# Patient Record
Sex: MALE | Race: WHITE | NOT HISPANIC OR LATINO | Employment: FULL TIME | URBAN - METROPOLITAN AREA
[De-identification: names, ages, dates, MRNs, and addresses within clinical notes are randomized per-mention and may not be internally consistent; named-entity substitution may affect disease eponyms.]

---

## 2023-03-03 PROBLEM — R63.5 ABNORMAL WEIGHT GAIN: Status: ACTIVE | Noted: 2023-03-03

## 2023-03-03 PROBLEM — D64.9 ANEMIA: Status: ACTIVE | Noted: 2023-03-03

## 2023-03-03 PROBLEM — E78.5 HYPERLIPIDEMIA: Status: ACTIVE | Noted: 2023-03-03

## 2023-03-03 PROBLEM — M25.562 ARTHRALGIA OF BOTH KNEES: Status: ACTIVE | Noted: 2023-03-03

## 2023-03-03 PROBLEM — M21.41 FLAT FEET: Status: ACTIVE | Noted: 2023-03-03

## 2023-03-03 PROBLEM — I10 HTN (HYPERTENSION), BENIGN: Status: ACTIVE | Noted: 2023-03-03

## 2023-03-03 PROBLEM — M54.50 LOW BACK PAIN: Status: ACTIVE | Noted: 2023-03-03

## 2023-03-03 PROBLEM — S46.211A TEAR OF RIGHT BICEPS MUSCLE: Status: ACTIVE | Noted: 2023-03-03

## 2023-03-03 PROBLEM — M21.169 ACQUIRED VARUS DEFORMITY KNEE: Status: ACTIVE | Noted: 2023-03-03

## 2023-03-03 PROBLEM — J01.81 OTHER ACUTE RECURRENT SINUSITIS: Status: ACTIVE | Noted: 2023-03-03

## 2023-03-03 PROBLEM — M17.0 ARTHRITIS OF BOTH KNEES: Status: ACTIVE | Noted: 2023-03-03

## 2023-03-03 PROBLEM — M77.42 METATARSALGIA OF LEFT FOOT: Status: ACTIVE | Noted: 2023-03-03

## 2023-03-03 PROBLEM — K21.9 ESOPHAGEAL REFLUX: Status: ACTIVE | Noted: 2023-03-03

## 2023-03-03 PROBLEM — M53.3 PAIN OF RIGHT SACROILIAC JOINT: Status: ACTIVE | Noted: 2023-03-03

## 2023-03-03 PROBLEM — M60.9: Status: ACTIVE | Noted: 2023-03-03

## 2023-03-03 PROBLEM — S46.219A BICEPS TENDON RUPTURE: Status: ACTIVE | Noted: 2023-03-03

## 2023-03-03 PROBLEM — E66.01 CLASS 2 SEVERE OBESITY WITH SERIOUS COMORBIDITY AND BODY MASS INDEX (BMI) OF 37.0 TO 37.9 IN ADULT (MULTI): Status: ACTIVE | Noted: 2023-03-03

## 2023-03-03 PROBLEM — E87.1 HYPONATREMIA: Status: ACTIVE | Noted: 2023-03-03

## 2023-03-03 PROBLEM — E66.01 MORBID OBESITY (MULTI): Status: ACTIVE | Noted: 2023-03-03

## 2023-03-03 PROBLEM — R03.0 ELEVATED BLOOD PRESSURE READING: Status: ACTIVE | Noted: 2023-03-03

## 2023-03-03 PROBLEM — R35.1 NOCTURIA: Status: ACTIVE | Noted: 2023-03-03

## 2023-03-03 PROBLEM — H65.01 RIGHT ACUTE SEROUS OTITIS MEDIA: Status: ACTIVE | Noted: 2023-03-03

## 2023-03-03 PROBLEM — K21.9 GERD (GASTROESOPHAGEAL REFLUX DISEASE): Status: ACTIVE | Noted: 2023-03-03

## 2023-03-03 PROBLEM — M54.16 LUMBAR RADICULOPATHY: Status: ACTIVE | Noted: 2023-03-03

## 2023-03-03 PROBLEM — L25.8 UNSPECIFIED CONTACT DERMATITIS DUE TO OTHER AGENTS: Status: ACTIVE | Noted: 2023-03-03

## 2023-03-03 PROBLEM — M25.511 RIGHT SHOULDER PAIN: Status: ACTIVE | Noted: 2023-03-03

## 2023-03-03 PROBLEM — M25.561 ARTHRALGIA OF BOTH KNEES: Status: ACTIVE | Noted: 2023-03-03

## 2023-03-03 PROBLEM — B07.8 PALMAR WART: Status: ACTIVE | Noted: 2023-03-03

## 2023-03-03 PROBLEM — J01.81 OTHER ACUTE RECURRENT SINUSITIS: Status: RESOLVED | Noted: 2023-03-03 | Resolved: 2023-03-03

## 2023-03-03 PROBLEM — M21.42 FLAT FEET: Status: ACTIVE | Noted: 2023-03-03

## 2023-03-03 PROBLEM — I51.9 HEART DISEASE: Status: ACTIVE | Noted: 2023-03-03

## 2023-03-03 PROBLEM — I25.10 CORONARY ARTERY DISEASE: Status: ACTIVE | Noted: 2023-03-03

## 2023-03-03 PROBLEM — E66.812 CLASS 2 SEVERE OBESITY WITH SERIOUS COMORBIDITY AND BODY MASS INDEX (BMI) OF 37.0 TO 37.9 IN ADULT: Status: ACTIVE | Noted: 2023-03-03

## 2023-03-03 PROBLEM — L23.89 ALLERGIC CONTACT DERMATITIS DUE TO OTHER AGENTS: Status: ACTIVE | Noted: 2023-03-03

## 2023-03-03 PROBLEM — R52 PAIN: Status: ACTIVE | Noted: 2023-03-03

## 2023-03-03 RX ORDER — FLUTICASONE PROPIONATE 50 MCG
1 SPRAY, SUSPENSION (ML) NASAL 2 TIMES DAILY
COMMUNITY
End: 2023-07-13 | Stop reason: WASHOUT

## 2023-03-03 RX ORDER — GLUCOSAMINE/MSM/CHONDROIT SULF 500-166.6
1 TABLET ORAL DAILY
COMMUNITY
Start: 2021-06-23 | End: 2023-07-13 | Stop reason: WASHOUT

## 2023-03-03 RX ORDER — TAMSULOSIN HYDROCHLORIDE 0.4 MG/1
0.8 CAPSULE ORAL NIGHTLY
COMMUNITY
End: 2023-10-12 | Stop reason: SDUPTHER

## 2023-03-03 RX ORDER — DOXAZOSIN 2 MG/1
2 TABLET ORAL NIGHTLY
COMMUNITY
Start: 2021-11-01

## 2023-03-03 RX ORDER — LOSARTAN POTASSIUM AND HYDROCHLOROTHIAZIDE 25; 100 MG/1; MG/1
1 TABLET ORAL DAILY
COMMUNITY
Start: 2021-11-01 | End: 2023-09-11 | Stop reason: ALTCHOICE

## 2023-03-03 RX ORDER — PANTOPRAZOLE SODIUM 40 MG/1
40 TABLET, DELAYED RELEASE ORAL
COMMUNITY
Start: 2017-09-05 | End: 2023-03-07 | Stop reason: SDUPTHER

## 2023-03-03 RX ORDER — METOPROLOL TARTRATE 25 MG/1
0.5 TABLET, FILM COATED ORAL NIGHTLY
COMMUNITY
Start: 2020-08-13 | End: 2024-01-24 | Stop reason: SDUPTHER

## 2023-03-03 RX ORDER — LIDOCAINE 50 MG/G
PATCH TOPICAL
COMMUNITY
Start: 2020-08-07 | End: 2023-07-13 | Stop reason: WASHOUT

## 2023-03-03 RX ORDER — GABAPENTIN 100 MG/1
2 CAPSULE ORAL NIGHTLY
COMMUNITY
Start: 2021-11-01 | End: 2023-07-13 | Stop reason: WASHOUT

## 2023-03-03 RX ORDER — CLOPIDOGREL BISULFATE 75 MG/1
1 TABLET ORAL DAILY
COMMUNITY
Start: 2020-07-28 | End: 2024-02-22 | Stop reason: SDUPTHER

## 2023-03-03 RX ORDER — ROSUVASTATIN CALCIUM 40 MG/1
1 TABLET, COATED ORAL NIGHTLY
COMMUNITY
Start: 2021-09-21

## 2023-03-03 RX ORDER — OXAPROZIN 600 MG/1
1 TABLET, FILM COATED ORAL 2 TIMES DAILY
COMMUNITY
Start: 2017-09-05 | End: 2023-07-13 | Stop reason: WASHOUT

## 2023-03-07 ENCOUNTER — OFFICE VISIT (OUTPATIENT)
Dept: PRIMARY CARE | Facility: CLINIC | Age: 63
End: 2023-03-07
Payer: COMMERCIAL

## 2023-03-07 VITALS
HEIGHT: 66 IN | SYSTOLIC BLOOD PRESSURE: 122 MMHG | BODY MASS INDEX: 36.16 KG/M2 | WEIGHT: 225 LBS | RESPIRATION RATE: 16 BRPM | HEART RATE: 62 BPM | TEMPERATURE: 97.5 F | DIASTOLIC BLOOD PRESSURE: 74 MMHG

## 2023-03-07 DIAGNOSIS — M25.562 LEFT KNEE PAIN, UNSPECIFIED CHRONICITY: ICD-10-CM

## 2023-03-07 DIAGNOSIS — M25.562 ARTHRALGIA OF BOTH KNEES: ICD-10-CM

## 2023-03-07 DIAGNOSIS — K21.9 GASTROESOPHAGEAL REFLUX DISEASE, UNSPECIFIED WHETHER ESOPHAGITIS PRESENT: Primary | ICD-10-CM

## 2023-03-07 DIAGNOSIS — M25.561 ARTHRALGIA OF BOTH KNEES: ICD-10-CM

## 2023-03-07 DIAGNOSIS — I51.9 HEART DISEASE: ICD-10-CM

## 2023-03-07 DIAGNOSIS — M25.562 PAIN AND SWELLING OF KNEE, LEFT: ICD-10-CM

## 2023-03-07 DIAGNOSIS — M25.462 PAIN AND SWELLING OF KNEE, LEFT: ICD-10-CM

## 2023-03-07 PROCEDURE — 3074F SYST BP LT 130 MM HG: CPT | Performed by: INTERNAL MEDICINE

## 2023-03-07 PROCEDURE — 3078F DIAST BP <80 MM HG: CPT | Performed by: INTERNAL MEDICINE

## 2023-03-07 PROCEDURE — 99213 OFFICE O/P EST LOW 20 MIN: CPT | Performed by: INTERNAL MEDICINE

## 2023-03-07 RX ORDER — PREDNISONE 20 MG/1
TABLET ORAL
Qty: 20 TABLET | Refills: 0 | Status: SHIPPED | OUTPATIENT
Start: 2023-03-07 | End: 2023-05-09 | Stop reason: ALTCHOICE

## 2023-03-07 RX ORDER — PANTOPRAZOLE SODIUM 40 MG/1
40 TABLET, DELAYED RELEASE ORAL
Qty: 30 TABLET | Refills: 11 | Status: SHIPPED | OUTPATIENT
Start: 2023-03-07 | End: 2023-09-05 | Stop reason: SDUPTHER

## 2023-03-07 ASSESSMENT — PAIN SCALES - GENERAL: PAINLEVEL: 8

## 2023-03-28 ENCOUNTER — APPOINTMENT (OUTPATIENT)
Dept: PRIMARY CARE | Facility: CLINIC | Age: 63
End: 2023-03-28
Payer: COMMERCIAL

## 2023-04-11 ENCOUNTER — TELEPHONE (OUTPATIENT)
Dept: PRIMARY CARE | Facility: CLINIC | Age: 63
End: 2023-04-11
Payer: COMMERCIAL

## 2023-04-11 NOTE — LETTER
April 11, 2023       No Recipients    Patient: Becca Horvath   YOB: 1960   Date of Visit: 4/11/2023       To whom it may concern,    Re: Mr Becca Horvath.     This is to verify, that Mr Becca Horvath can return to work 4/17/2023 with           Sincerely,     Suri Negro MD

## 2023-05-09 ENCOUNTER — OFFICE VISIT (OUTPATIENT)
Dept: PRIMARY CARE | Facility: CLINIC | Age: 63
End: 2023-05-09
Payer: COMMERCIAL

## 2023-05-09 VITALS
TEMPERATURE: 97.3 F | HEIGHT: 66 IN | SYSTOLIC BLOOD PRESSURE: 122 MMHG | DIASTOLIC BLOOD PRESSURE: 76 MMHG | HEART RATE: 64 BPM | RESPIRATION RATE: 16 BRPM | BODY MASS INDEX: 34.87 KG/M2 | WEIGHT: 217 LBS

## 2023-05-09 DIAGNOSIS — R10.9 ABDOMINAL PAIN, UNSPECIFIED ABDOMINAL LOCATION: Primary | ICD-10-CM

## 2023-05-09 PROCEDURE — 99213 OFFICE O/P EST LOW 20 MIN: CPT | Performed by: INTERNAL MEDICINE

## 2023-05-09 PROCEDURE — 1036F TOBACCO NON-USER: CPT | Performed by: INTERNAL MEDICINE

## 2023-05-09 PROCEDURE — 3074F SYST BP LT 130 MM HG: CPT | Performed by: INTERNAL MEDICINE

## 2023-05-09 PROCEDURE — 3078F DIAST BP <80 MM HG: CPT | Performed by: INTERNAL MEDICINE

## 2023-05-09 ASSESSMENT — ENCOUNTER SYMPTOMS
BACK PAIN: 1
ABDOMINAL PAIN: 1

## 2023-05-09 ASSESSMENT — PAIN SCALES - GENERAL: PAINLEVEL: 6

## 2023-05-09 NOTE — PROGRESS NOTES
Subjective    Becca Horvath is a 62 y.o. male who presents for  follow up.  Has RLQ abdominal pain episodes.  Complaining of knee pain, difficulty to ambulate.  Scheduled his surgery 6/7/2023.       Abdominal Pain  This is a recurrent problem. The current episode started 1 to 4 weeks ago. The onset quality is sudden. The problem occurs intermittently. Duration: from 5 min  to 15 min. The pain is located in the RLQ. The pain is at a severity of 10/10. The pain is severe. The quality of the pain is tearing. The abdominal pain does not radiate. Nothing aggravates the pain. The pain is relieved by Recumbency. He has tried nothing for the symptoms.     This is a 62 years Old man with medical History of HTN, Hyperlipidemia, GERD, CAD, s/p PCI placement with 4 stents placement, anemia, bilateral knee osteoarthritis, BPH.  Patient is presented for evaluation and treatment of the above complaints.  Has abdominal pain discomfort.    Patient is  seen  by Dr Nessa Mcgill, has had MRI done 2/13/2023.  MRI showed advanced Tricompartmental osteoarthrosis, most pronounced over the medial femorotibial compartment.  Moderate volume effusion with synovitis.  High-grade sprain of the medial collateral ligament component of the meniscal femoral ligament.  Suspect subacute to remote low to intermediate grade sprain or superficial component of medial collateral ligament.  Complex medial and lateral meniscal tears as above.   No improvement from any therapy.  Scheduled to have surgery 6/7/2023.  Has difficulty to ambulate, difficulty to change his position, using crutches.      Followed by cardiology.        Review of Systems   Cardiovascular:  Negative for chest pain and leg swelling.   Gastrointestinal:  Positive for abdominal pain.   Musculoskeletal:  Positive for back pain.       Objective        Vitals:    05/09/23 1540   BP: 122/76   Pulse: 64   Resp: 16   Temp: 36.3 °C (97.3 °F)        Physical Exam  Constitutional:       Appearance:  Normal appearance.   HENT:      Head: Normocephalic.      Nose: Nose normal.      Mouth/Throat:      Mouth: Mucous membranes are dry.   Eyes:      Extraocular Movements: Extraocular movements intact.   Cardiovascular:      Rate and Rhythm: Normal rate and regular rhythm.   Abdominal:      Palpations: Abdomen is soft.   Musculoskeletal:      Cervical back: Neck supple.   Skin:     General: Skin is warm.   Neurological:      General: No focal deficit present.      Mental Status: He is alert.   Psychiatric:         Mood and Affect: Mood normal.       Diagnoses and all orders for this visit:  Abdominal pain, unspecified abdominal location (Primary)  -     CT abdomen pelvis wo IV contrast; Future       -Left knee pain.  Due to L Knee replacement 6/7/2023.  [facility].  Had MRI  done, showed advanced Tricompartmental osteoarthrosis, most pronounced over the medial femorotibial compartment.  Moderate volume effusion with synovitis.  High-grade sprain of the medial collateral ligament component of the meniscal femoral ligament.  Suspect subacute to remote low to intermediate grade sprain or superficial component of medial collateral ligament.  Complex medial and lateral meniscal tears as above.  Take steroids as directed.  Taper down every 3 days.     -Coronary artery disease.  History of stent placements 6 years ago.  Will obtain records.  Control risk factors.  Take medications as directed.     -Hypertension.  Well-controlled.  Avoid salt, exercise.     -Hyperlipidemia.  Taking rosuvastatin 40 mg at nighttime.  Keep Mediterranean diet, exercise.     -BPH.  Taking doxazosin.  PSA.     -Gastroesophageal reflux disease.  Eat small portions.  Avoid Caffeine, spicy foods, chocolate, alcohol.  Do not wear tight clothes.  Keep last meal before bed time > 3 hours.  Keep head side of the bed elevated at all time.     -Health maintenance.  Return for complete physical exam.  Colonoscopy.  Ophthalmology.  Influenza  vaccination today.     - Class 2 Obesity with BMI 35.02.  Diet, exercise.  Keep ideal BMI less than 25.     Suri Negro MD

## 2023-05-11 DIAGNOSIS — R91.1 PULMONARY NODULE: Primary | ICD-10-CM

## 2023-05-11 NOTE — PROGRESS NOTES
Left message about patient's CT scan of the abdomen pelvis results.  Patient is in need for CT scan of chest for follow-up of pulmonary nodules.

## 2023-05-12 ENCOUNTER — TELEPHONE (OUTPATIENT)
Dept: PRIMARY CARE | Facility: CLINIC | Age: 63
End: 2023-05-12
Payer: COMMERCIAL

## 2023-06-05 ENCOUNTER — TELEPHONE (OUTPATIENT)
Dept: PRIMARY CARE | Facility: CLINIC | Age: 63
End: 2023-06-05
Payer: COMMERCIAL

## 2023-06-05 NOTE — TELEPHONE ENCOUNTER
Cussed with patient about CT scan results.  He is scheduled to have nonemergent CT scan of the chest done for small pulmonary nodules.  Patient is aware.  Scheduled to have orthopedic surgery 6/7/2023.

## 2023-07-13 ENCOUNTER — OFFICE VISIT (OUTPATIENT)
Dept: PRIMARY CARE | Facility: CLINIC | Age: 63
End: 2023-07-13
Payer: COMMERCIAL

## 2023-07-13 DIAGNOSIS — R53.81 MALAISE AND FATIGUE: ICD-10-CM

## 2023-07-13 DIAGNOSIS — I10 HTN (HYPERTENSION), BENIGN: ICD-10-CM

## 2023-07-13 DIAGNOSIS — R73.9 HYPERGLYCEMIA: ICD-10-CM

## 2023-07-13 DIAGNOSIS — R53.83 MALAISE AND FATIGUE: ICD-10-CM

## 2023-07-13 DIAGNOSIS — E87.1 HYPONATREMIA: Primary | ICD-10-CM

## 2023-07-13 PROCEDURE — 85025 COMPLETE CBC W/AUTO DIFF WBC: CPT | Performed by: INTERNAL MEDICINE

## 2023-07-13 PROCEDURE — 3074F SYST BP LT 130 MM HG: CPT | Performed by: INTERNAL MEDICINE

## 2023-07-13 PROCEDURE — 80048 BASIC METABOLIC PNL TOTAL CA: CPT | Performed by: INTERNAL MEDICINE

## 2023-07-13 PROCEDURE — 83036 HEMOGLOBIN GLYCOSYLATED A1C: CPT | Performed by: INTERNAL MEDICINE

## 2023-07-13 PROCEDURE — 99213 OFFICE O/P EST LOW 20 MIN: CPT | Performed by: INTERNAL MEDICINE

## 2023-07-13 PROCEDURE — 1036F TOBACCO NON-USER: CPT | Performed by: INTERNAL MEDICINE

## 2023-07-13 PROCEDURE — 3078F DIAST BP <80 MM HG: CPT | Performed by: INTERNAL MEDICINE

## 2023-07-13 NOTE — PROGRESS NOTES
Subjective    Becca Horvath is a 62 y.o. male who presents for  follow up.  Concerned about low blood pressure episodes.  Concerned about Hyponatremia.  Has L knee pain.      HPI    This is a 62 years Old man with medical History of HTN, Hyperlipidemia, GERD, CAD, s/p PCI placement with 4 stents placement, anemia, bilateral knee osteoarthritis, BPH, s/p  L TKR 6/7/2023.  Patient is presented for evaluation and treatment of the above complaints.   Still has L knee pain, followed by orthopedic surgery.  Evaluated by Dr Morin, for low blood pressure episodes.  Has BW done, has Hyponatremia.  Advised to stop taking hydrochlorothiazide.      Recovering from L knee replacement surgery 6/7/2023.    Still has pain.     Review of Systems   Constitutional:  Positive for activity change and fatigue. Negative for appetite change and unexpected weight change.   Musculoskeletal:  Positive for arthralgias and back pain.   Psychiatric/Behavioral:  Positive for sleep disturbance. The patient is nervous/anxious.        Objective        Vitals:    07/13/23 1105   BP: 118/62   Pulse: 62   Resp: 16   Temp: 36.1 °C (97 °F)        Physical Exam  Constitutional:       Appearance: Normal appearance.   HENT:      Head: Normocephalic.      Nose: Nose normal.   Cardiovascular:      Pulses: Normal pulses.   Pulmonary:      Effort: Pulmonary effort is normal.   Abdominal:      Palpations: Abdomen is soft.   Musculoskeletal:         General: Normal range of motion.      Cervical back: Normal range of motion.   Skin:     General: Skin is warm.   Neurological:      General: No focal deficit present.      Mental Status: He is alert.       Diagnoses and all orders for this visit:  Hyponatremia (Primary)  -     Basic Metabolic Panel  Malaise and fatigue  -     CBC  Hyperglycemia  -     Hemoglobin A1C  HTN (hypertension), benign  -     losartan (Cozaar) 100 mg tablet; Take 1 tablet (100 mg) by mouth once daily. Stop taking Hyzaar        Hypotension.  Stopped fish oil, other supplements.  Advised to stop taking Hydrochlorothiazide.  Patient is still taking.  Repeat BMP today.     -Hyponatremia.  Repeated blood work 7/14/2023 showed sodium 138, chloride 94.  Stop taking HCTZ.  For your blood pressure you will be taking losartan only.    -Left knee pain.  S/p  L Knee replacement 6/7/2023.   Going through PT.  Not returned to work yet.  Due to return to work 8/2023.  Hyponatremia.  Repeat bmp.     -Coronary artery disease.  History of stent placements 6 years ago.  Will obtain records.  Control risk factors.  Take medications as directed.     -Hypertension.  Well-controlled.  Avoid salt, exercise.     -Hyperlipidemia.  Taking rosuvastatin 40 mg at nighttime.  Keep Mediterranean diet, exercise.     -BPH.  Taking doxazosin.  PSA.     -Gastroesophageal reflux disease.  Eat small portions.  Avoid Caffeine, spicy foods, chocolate, alcohol.  Do not wear tight clothes.  Keep last meal before bed time > 3 hours.  Keep head side of the bed elevated at all time.     -Health maintenance.  Return for complete physical exam.  Colonoscopy.  Ophthalmology.  Influenza vaccination today.     - Class 2 Obesity with BMI 34.14  Diet, exercise.  Keep ideal BMI less than 25.   Suri Negro MD

## 2023-07-14 RX ORDER — LOSARTAN POTASSIUM 100 MG/1
100 TABLET ORAL DAILY
Qty: 90 TABLET | Refills: 2 | Status: SHIPPED | OUTPATIENT
Start: 2023-07-14 | End: 2024-04-08 | Stop reason: SDUPTHER

## 2023-07-16 VITALS
DIASTOLIC BLOOD PRESSURE: 62 MMHG | SYSTOLIC BLOOD PRESSURE: 118 MMHG | TEMPERATURE: 97 F | RESPIRATION RATE: 16 BRPM | HEIGHT: 65 IN | HEART RATE: 62 BPM | WEIGHT: 202 LBS | BODY MASS INDEX: 33.66 KG/M2

## 2023-07-16 ASSESSMENT — ENCOUNTER SYMPTOMS
ARTHRALGIAS: 1
BACK PAIN: 1
SLEEP DISTURBANCE: 1
APPETITE CHANGE: 0
ACTIVITY CHANGE: 1
FATIGUE: 1
NERVOUS/ANXIOUS: 1
UNEXPECTED WEIGHT CHANGE: 0

## 2023-09-05 DIAGNOSIS — K21.9 GASTROESOPHAGEAL REFLUX DISEASE, UNSPECIFIED WHETHER ESOPHAGITIS PRESENT: ICD-10-CM

## 2023-09-05 RX ORDER — PANTOPRAZOLE SODIUM 40 MG/1
40 TABLET, DELAYED RELEASE ORAL
Qty: 90 TABLET | Refills: 0 | Status: SHIPPED | OUTPATIENT
Start: 2023-09-05 | End: 2023-12-18 | Stop reason: SDUPTHER

## 2023-09-09 NOTE — PROGRESS NOTES
Subjective    Becca Horvath is a 63 y.o. male who presents for  follow up.  Has R knee pain.  Has been having R sided low abdominal quadrant pain.  Weak.  Patient is here for Pulmonary nodules follow up.    HPI    This is a 62 years Old man with medical History of HTN, Hyperlipidemia, GERD, CAD, s/p PCI placement with 4 stents placement, anemia, bilateral knee osteoarthritis, BPH, s/p  L TKR 6/7/2023.  Patient is presented for evaluation and treatment of the above complaints.   Still has R  knee pain, followed by orthopedic surgery.  Scheduled to have surgery 12/2023.    Off  hydrochlorothiazide.       Review of Systems   Constitutional:  Negative for activity change and appetite change.   HENT:  Negative for congestion.    Respiratory:  Negative for apnea.    Gastrointestinal:  Positive for abdominal pain.   Musculoskeletal:  Positive for arthralgias.       Objective        Vitals:    09/11/23 1526   BP: 136/78   Pulse: 62   Resp: 16   Temp: 36.4 °C (97.5 °F)        Physical Exam  Constitutional:       Appearance: Normal appearance.   HENT:      Nose: Nose normal.   Eyes:      Pupils: Pupils are equal, round, and reactive to light.   Cardiovascular:      Rate and Rhythm: Normal rate and regular rhythm.   Pulmonary:      Effort: Pulmonary effort is normal.   Musculoskeletal:         General: Tenderness present. Normal range of motion.      Cervical back: Normal range of motion.   Skin:     General: Skin is warm.   Neurological:      General: No focal deficit present.      Mental Status: He is alert.   Psychiatric:         Mood and Affect: Mood normal.       Diagnoses and all orders for this visit:  Unilateral inguinal hernia without obstruction or gangrene, recurrence not specified (Primary)  -     Referral to General Surgery; Future  Iron deficiency anemia, unspecified iron deficiency anemia type  -     Ferritin  -     Iron and TIBC  -     CBC  -     ferrous sulfate 325 (65 Fe) MG EC tablet; Take 1 tablet (325 mg) by  mouth once daily with a meal. Do not crush, chew, or split.  Hyponatremia  -     Basic Metabolic Panel  - Umbilical hernia.  See surgery.    -Pulmonary nodules.  CT scan is scheduled for 9/2023.    HTN (hypertension), benign  -     losartan (Cozaar) 100 mg tablet; Take 1 tablet (100 mg) by mouth once daily.   Off Hyzaar.     -Left knee pain.  S/p  L Knee replacement 6/7/2023.  Had completed  PT.     R knee pain.  Scheduled for replacement 12/2023.    -Coronary artery disease.  History of stent placements 6 years ago.  Will obtain records.  Control risk factors.  Take medications as directed.         -Hyperlipidemia.  Taking rosuvastatin 40 mg at nighttime.  Keep Mediterranean diet, exercise.     -BPH.  Taking doxazosin.  PSA.     -Gastroesophageal reflux disease.  Eat small portions.  Avoid Caffeine, spicy foods, chocolate, alcohol.  Do not wear tight clothes.  Keep last meal before bed time > 3 hours.  Keep head side of the bed elevated at all time.     -Health maintenance.  Return for complete physical exam.  Colonoscopy.  Ophthalmology.  Influenza vaccination t.     - Class 2 Obesity with BMI 33.64.  Diet, exercise.  Keep ideal BMI less than 25.     Suri Negro MD

## 2023-09-11 ENCOUNTER — OFFICE VISIT (OUTPATIENT)
Dept: PRIMARY CARE | Facility: CLINIC | Age: 63
End: 2023-09-11
Payer: COMMERCIAL

## 2023-09-11 VITALS
TEMPERATURE: 97.5 F | RESPIRATION RATE: 16 BRPM | HEIGHT: 64 IN | WEIGHT: 196 LBS | BODY MASS INDEX: 33.46 KG/M2 | DIASTOLIC BLOOD PRESSURE: 78 MMHG | SYSTOLIC BLOOD PRESSURE: 136 MMHG | HEART RATE: 62 BPM

## 2023-09-11 DIAGNOSIS — K40.90 UNILATERAL INGUINAL HERNIA WITHOUT OBSTRUCTION OR GANGRENE, RECURRENCE NOT SPECIFIED: Primary | ICD-10-CM

## 2023-09-11 DIAGNOSIS — D50.9 IRON DEFICIENCY ANEMIA, UNSPECIFIED IRON DEFICIENCY ANEMIA TYPE: ICD-10-CM

## 2023-09-11 DIAGNOSIS — E87.1 HYPONATREMIA: ICD-10-CM

## 2023-09-11 LAB
FERRITIN (UG/LL) IN SER/PLAS: 167 UG/L (ref 20–300)
IRON (UG/DL) IN SER/PLAS: 69 UG/DL (ref 35–150)
IRON BINDING CAPACITY (UG/DL) IN SER/PLAS: 325 UG/DL (ref 240–445)
IRON SATURATION (%) IN SER/PLAS: 21 % (ref 25–45)

## 2023-09-11 PROCEDURE — 99213 OFFICE O/P EST LOW 20 MIN: CPT | Performed by: INTERNAL MEDICINE

## 2023-09-11 PROCEDURE — 82728 ASSAY OF FERRITIN: CPT

## 2023-09-11 PROCEDURE — 83540 ASSAY OF IRON: CPT

## 2023-09-11 PROCEDURE — 3075F SYST BP GE 130 - 139MM HG: CPT | Performed by: INTERNAL MEDICINE

## 2023-09-11 PROCEDURE — 83550 IRON BINDING TEST: CPT

## 2023-09-11 PROCEDURE — 1036F TOBACCO NON-USER: CPT | Performed by: INTERNAL MEDICINE

## 2023-09-11 PROCEDURE — 85025 COMPLETE CBC W/AUTO DIFF WBC: CPT | Performed by: INTERNAL MEDICINE

## 2023-09-11 PROCEDURE — 3078F DIAST BP <80 MM HG: CPT | Performed by: INTERNAL MEDICINE

## 2023-09-11 PROCEDURE — 80048 BASIC METABOLIC PNL TOTAL CA: CPT | Performed by: INTERNAL MEDICINE

## 2023-09-11 RX ORDER — FERROUS SULFATE 325(65) MG
325 TABLET, DELAYED RELEASE (ENTERIC COATED) ORAL
Qty: 90 TABLET | Refills: 1 | Status: SHIPPED | OUTPATIENT
Start: 2023-09-11 | End: 2023-12-10

## 2023-09-11 ASSESSMENT — ENCOUNTER SYMPTOMS
APPETITE CHANGE: 0
APNEA: 0
ABDOMINAL PAIN: 1
ARTHRALGIAS: 1
ACTIVITY CHANGE: 0

## 2023-09-11 ASSESSMENT — PAIN SCALES - GENERAL: PAINLEVEL: 6

## 2023-10-02 ENCOUNTER — ANCILLARY PROCEDURE (OUTPATIENT)
Dept: RADIOLOGY | Facility: CLINIC | Age: 63
End: 2023-10-02
Payer: COMMERCIAL

## 2023-10-02 DIAGNOSIS — R91.1 PULMONARY NODULE: ICD-10-CM

## 2023-10-02 PROCEDURE — 71250 CT THORAX DX C-: CPT | Performed by: STUDENT IN AN ORGANIZED HEALTH CARE EDUCATION/TRAINING PROGRAM

## 2023-10-02 PROCEDURE — 71250 CT THORAX DX C-: CPT

## 2023-10-06 ENCOUNTER — TELEPHONE (OUTPATIENT)
Dept: PRIMARY CARE | Facility: CLINIC | Age: 63
End: 2023-10-06
Payer: COMMERCIAL

## 2023-10-06 DIAGNOSIS — R91.8 PULMONARY NODULES: Primary | ICD-10-CM

## 2023-10-07 NOTE — PROGRESS NOTES
Subjective    Becca Horvath is a 63 y.o. male who presents for No chief complaint on file..  HPI    Review of Systems    Objective      There were no vitals filed for this visit.     Physical Exam  Diagnoses and all orders for this visit:  Pulmonary nodules (Primary)  -     CT chest wo IV contrast; Future      Suri Negro MD

## 2023-10-12 ENCOUNTER — OFFICE VISIT (OUTPATIENT)
Dept: PRIMARY CARE | Facility: CLINIC | Age: 63
End: 2023-10-12
Payer: COMMERCIAL

## 2023-10-12 VITALS
BODY MASS INDEX: 31.12 KG/M2 | DIASTOLIC BLOOD PRESSURE: 80 MMHG | SYSTOLIC BLOOD PRESSURE: 150 MMHG | TEMPERATURE: 96.9 F | WEIGHT: 193.6 LBS | HEIGHT: 66 IN | RESPIRATION RATE: 16 BRPM | HEART RATE: 60 BPM | OXYGEN SATURATION: 98 %

## 2023-10-12 DIAGNOSIS — R91.8 MULTIPLE LUNG NODULES ON CT: Primary | ICD-10-CM

## 2023-10-12 DIAGNOSIS — N40.0 BENIGN PROSTATIC HYPERPLASIA, UNSPECIFIED WHETHER LOWER URINARY TRACT SYMPTOMS PRESENT: Primary | ICD-10-CM

## 2023-10-12 PROBLEM — I63.9 CRYPTOGENIC STROKE (MULTI): Status: ACTIVE | Noted: 2020-07-28

## 2023-10-12 PROBLEM — M79.673 FOOT PAIN: Status: ACTIVE | Noted: 2017-12-10

## 2023-10-12 PROBLEM — M79.676 PAIN IN TOE: Status: ACTIVE | Noted: 2019-10-15

## 2023-10-12 PROBLEM — M79.675 PAIN IN TOES OF BOTH FEET: Status: ACTIVE | Noted: 2019-05-07

## 2023-10-12 PROBLEM — I25.10 ARTERIOSCLEROSIS OF CORONARY ARTERY: Status: ACTIVE | Noted: 2017-11-15

## 2023-10-12 PROBLEM — M79.674 PAIN IN TOES OF BOTH FEET: Status: ACTIVE | Noted: 2019-05-07

## 2023-10-12 PROBLEM — M21.6X9: Status: ACTIVE | Noted: 2020-12-07

## 2023-10-12 PROBLEM — M17.11 PRIMARY OSTEOARTHRITIS OF RIGHT KNEE: Status: ACTIVE | Noted: 2023-06-30

## 2023-10-12 PROBLEM — M79.605 PAIN IN BOTH LOWER EXTREMITIES: Status: ACTIVE | Noted: 2019-07-08

## 2023-10-12 PROBLEM — Q82.8: Status: ACTIVE | Noted: 2018-10-23

## 2023-10-12 PROBLEM — M21.969 DEFORMITY OF METATARSAL: Status: ACTIVE | Noted: 2019-10-15

## 2023-10-12 PROBLEM — R39.15 URINARY URGENCY: Status: ACTIVE | Noted: 2023-03-09

## 2023-10-12 PROBLEM — N40.1 BPH WITH OBSTRUCTION/LOWER URINARY TRACT SYMPTOMS: Status: ACTIVE | Noted: 2023-03-09

## 2023-10-12 PROBLEM — B35.3 TINEA PEDIS: Status: ACTIVE | Noted: 2020-02-18

## 2023-10-12 PROBLEM — N32.81 OAB (OVERACTIVE BLADDER): Status: ACTIVE | Noted: 2023-03-09

## 2023-10-12 PROBLEM — I87.8 VENOUS STASIS: Status: ACTIVE | Noted: 2023-10-12

## 2023-10-12 PROBLEM — Z95.5 STENTED CORONARY ARTERY: Status: ACTIVE | Noted: 2017-10-05

## 2023-10-12 PROBLEM — N52.03 COMBINED ARTERIAL INSUFFICIENCY AND CORPORO-VENOUS OCCLUSIVE ERECTILE DYSFUNCTION: Status: ACTIVE | Noted: 2023-03-09

## 2023-10-12 PROBLEM — M48.00 SPINAL STENOSIS: Status: ACTIVE | Noted: 2023-10-12

## 2023-10-12 PROBLEM — M79.604 PAIN IN BOTH LOWER EXTREMITIES: Status: ACTIVE | Noted: 2019-07-08

## 2023-10-12 PROBLEM — R29.90 STROKE-LIKE SYMPTOMS: Status: ACTIVE | Noted: 2020-06-05

## 2023-10-12 PROBLEM — N13.8 BPH WITH OBSTRUCTION/LOWER URINARY TRACT SYMPTOMS: Status: ACTIVE | Noted: 2023-03-09

## 2023-10-12 PROBLEM — M21.169 ACQUIRED GENU VARUM: Status: ACTIVE | Noted: 2021-10-14

## 2023-10-12 PROBLEM — B35.1 ONYCHOMYCOSIS OF TOENAIL: Status: ACTIVE | Noted: 2018-06-05

## 2023-10-12 PROBLEM — S90.122A CONTUSION OF TOE OF LEFT FOOT: Status: ACTIVE | Noted: 2022-05-24

## 2023-10-12 PROBLEM — Z96.652 STATUS POST TOTAL LEFT KNEE REPLACEMENT: Status: ACTIVE | Noted: 2023-06-07

## 2023-10-12 PROCEDURE — 1036F TOBACCO NON-USER: CPT | Performed by: NURSE PRACTITIONER

## 2023-10-12 PROCEDURE — 99212 OFFICE O/P EST SF 10 MIN: CPT | Mod: ZK | Performed by: NURSE PRACTITIONER

## 2023-10-12 PROCEDURE — 3077F SYST BP >= 140 MM HG: CPT | Performed by: NURSE PRACTITIONER

## 2023-10-12 PROCEDURE — 3079F DIAST BP 80-89 MM HG: CPT | Performed by: NURSE PRACTITIONER

## 2023-10-12 PROCEDURE — 99213 OFFICE O/P EST LOW 20 MIN: CPT | Performed by: NURSE PRACTITIONER

## 2023-10-12 RX ORDER — AMLODIPINE BESYLATE 5 MG/1
5 TABLET ORAL
COMMUNITY
Start: 2023-09-26

## 2023-10-12 RX ORDER — TAMSULOSIN HYDROCHLORIDE 0.4 MG/1
0.8 CAPSULE ORAL NIGHTLY
Qty: 60 CAPSULE | Refills: 0 | Status: SHIPPED | OUTPATIENT
Start: 2023-10-12 | End: 2023-11-06 | Stop reason: SDUPTHER

## 2023-10-12 SDOH — ECONOMIC STABILITY: FOOD INSECURITY: WITHIN THE PAST 12 MONTHS, THE FOOD YOU BOUGHT JUST DIDN'T LAST AND YOU DIDN'T HAVE MONEY TO GET MORE.: NEVER TRUE

## 2023-10-12 SDOH — ECONOMIC STABILITY: FOOD INSECURITY: WITHIN THE PAST 12 MONTHS, YOU WORRIED THAT YOUR FOOD WOULD RUN OUT BEFORE YOU GOT MONEY TO BUY MORE.: NEVER TRUE

## 2023-10-12 ASSESSMENT — COLUMBIA-SUICIDE SEVERITY RATING SCALE - C-SSRS
6. HAVE YOU EVER DONE ANYTHING, STARTED TO DO ANYTHING, OR PREPARED TO DO ANYTHING TO END YOUR LIFE?: NO
2. HAVE YOU ACTUALLY HAD ANY THOUGHTS OF KILLING YOURSELF?: NO
1. IN THE PAST MONTH, HAVE YOU WISHED YOU WERE DEAD OR WISHED YOU COULD GO TO SLEEP AND NOT WAKE UP?: NO

## 2023-10-12 ASSESSMENT — PATIENT HEALTH QUESTIONNAIRE - PHQ9
2. FEELING DOWN, DEPRESSED OR HOPELESS: NOT AT ALL
SUM OF ALL RESPONSES TO PHQ9 QUESTIONS 1 AND 2: 0
1. LITTLE INTEREST OR PLEASURE IN DOING THINGS: NOT AT ALL

## 2023-10-12 ASSESSMENT — ENCOUNTER SYMPTOMS
DEPRESSION: 0
OCCASIONAL FEELINGS OF UNSTEADINESS: 1
LOSS OF SENSATION IN FEET: 0

## 2023-10-12 ASSESSMENT — PAIN SCALES - GENERAL: PAINLEVEL: 0-NO PAIN

## 2023-10-12 NOTE — PROGRESS NOTES
Subjective   Patient ID: Becca Horvath is a 63 y.o. male who presents for Follow-up (Patient presents for follow up visit for CT scan results.).    HPI   63-year-old male presents today for lung nodule clinic.    Former smoker and quit almost 40 years ago. He smoked for about 6 years one pack per day. No personal or family history of cancer.     CT chest dated 10/2/2023  Bilateral lungs demonstrate mild paraseptal emphysematous changes  predominantly involving bilateral lung apices. There are few  scattered cysts in bilateral upper lobes which could again be related  to emphysematous changes. No dense focal consolidative airspace  opacity is noted. No evidence of pleural effusions or pneumothorax.  There are few scattered pulmonary nodules in bilateral lungs as  follows.      Right lun. There is a 3 mm pulmonary nodule in the right lung apex (axial  image 82/364)  2. There is a punctate 1-2 mm calcified pulmonary nodule in the right  middle lobe (axial image 187/364).  3. There are 2 pulmonary nodules located adjacent to each other in  the lateral aspect of right middle lobe measuring approximately 5 mm  and 2 mm (best seen on axial image 210/364).  4. There is another 5 mm pulmonary nodule in the right lower lobe  (axial image 219/364).  5. There is a 6 mm subpleural pulmonary nodule in the posterior  aspect of the right lower lobe (axial image 279/364).      Left lun. There is a 3 mm pulmonary nodule just adjacent to the pulmonary  cyst in the left lower lobe (axial image 136/364).  2. 4 mm pulmonary nodule in the lingula (axial image 203/364) 6 mm  pulmonary nodule in the left lower lobe (axial image 205/364)  3. 3 mm pulmonary nodule in the lateral aspect of left lower lobe  (axial image 245/364).    CT abdomen pelvis dated May 10, 2023  Right middle lobe 6 mm pulmonary nodule. Right lower lobe pulmonary nodules measuring up to 4 mm. Few pneumatoceles in the lower lobes bilaterally.  Review of  "Systems  Review of systems: Present-feeling well. Not present-chills, fatigue and fever.  Respiratory: Not present-difficulty breathing, cough, bloody sputum.  Cardiovascular: Not present-chest pain, palpitations, dyspnea on exertion.  Objective   /80 (BP Location: Right arm, Patient Position: Sitting, BP Cuff Size: Adult)   Pulse 60   Temp 36.1 °C (96.9 °F) (Temporal)   Resp 16   Ht 1.676 m (5' 6\")   Wt 87.8 kg (193 lb 9.6 oz)   SpO2 98%   BMI 31.25 kg/m²     Physical Exam  General: Mental status-alert. General appearance-cooperative, well groomed and consistent with stated age. Not in acute distress. Orientation-oriented to time, place, purpose and person. Build and nutrition-well-nourished and well-developed. Hydration-well-hydrated.    Integumentary: Color-normal coloration of the skin. Skin moisture-normal skin moisture.    Head and neck: Neck-full range of motion and supple. No lymphadenopathy and no nuchal rigidity.    Chest and lung exam: Auscultation: Breath sounds: normal. No adventitious lung sounds.    Cardiovascular: Auscultation-rhythm-regular.  Heart sounds-normal heart sounds S1 and S2. No murmurs or gallops appreciated. No carotid bruits bilaterally.    Assessment/Plan   Diagnoses and all orders for this visit:  Multiple lung nodules on CT  -     CT chest wo IV contrast; Future    Multiple lung nodules noted on CT-  Recommend CT chest 12-18 months.  Patient will be notified of results as they become available.  "

## 2023-10-17 ENCOUNTER — OFFICE VISIT (OUTPATIENT)
Dept: SURGERY | Facility: CLINIC | Age: 63
End: 2023-10-17
Payer: COMMERCIAL

## 2023-10-17 VITALS
SYSTOLIC BLOOD PRESSURE: 120 MMHG | DIASTOLIC BLOOD PRESSURE: 79 MMHG | HEIGHT: 66 IN | HEART RATE: 61 BPM | TEMPERATURE: 97.9 F | BODY MASS INDEX: 31.02 KG/M2 | RESPIRATION RATE: 16 BRPM | WEIGHT: 193 LBS

## 2023-10-17 DIAGNOSIS — K40.90 UNILATERAL INGUINAL HERNIA WITHOUT OBSTRUCTION OR GANGRENE, RECURRENCE NOT SPECIFIED: Primary | ICD-10-CM

## 2023-10-17 PROCEDURE — 99213 OFFICE O/P EST LOW 20 MIN: CPT | Performed by: SURGERY

## 2023-10-17 PROCEDURE — 3078F DIAST BP <80 MM HG: CPT | Performed by: SURGERY

## 2023-10-17 PROCEDURE — 1036F TOBACCO NON-USER: CPT | Performed by: SURGERY

## 2023-10-17 PROCEDURE — 99203 OFFICE O/P NEW LOW 30 MIN: CPT | Performed by: SURGERY

## 2023-10-17 PROCEDURE — 3074F SYST BP LT 130 MM HG: CPT | Performed by: SURGERY

## 2023-10-17 ASSESSMENT — PAIN SCALES - GENERAL: PAINLEVEL: 5

## 2023-10-17 NOTE — PROGRESS NOTES
"History Of Present Illness  Becca Horvath is a 63 y.o. male presenting with right inguinal hernia.  He had a previous left knee replacement December.  CT scan prior to this did show small right inguinal hernia.  It is gotten larger since then.  Starting to bother her more.  He is planning another knee replacement this coming December.  He would like to have this hernia replaced before that other knee operation since during recovery the hernias been following.  He had a recent negative Cologuard.  He has no prostate symptoms.  No previous abdominal surgeries.  He has a history of heart disease he had stents in the past but he is followed closely by his cardiologist he has been able to stop his blood thinners prior to his knee replacement at the [facility].  Those records are easy to review at the [facility].  He knows to stop in the same way that he did for his knee replacement.  Past Medical History  He has no past medical history on file.    Surgical History  He has a past surgical history that includes Foot surgery (09/05/2017) and Other surgical history (09/05/2017).     Social History  He reports that he has never smoked. He has never been exposed to tobacco smoke. He has never used smokeless tobacco. He reports that he does not currently use drugs. He reports that he does not drink alcohol.     Allergies  Atorvastatin, Celecoxib, Diclofenac-misoprostol, and Lisinopril     physical examination he has a right inguinal hernia that is reducible.  No evidence of a left inguinal hernia.  No other abdominal incisions.  Last Recorded Vitals  Blood pressure 120/79, pulse 61, temperature 36.6 °C (97.9 °F), resp. rate 16, height 1.676 m (5' 6\"), weight 87.5 kg (193 lb).    Relevant Results reviewed the results from the [facility] in epic.        Assessment/Plan reviewed the hernia booklet with him.  Plan laparoscopic right inguinal hernia repair at his convenience.  He would like done soon we will try to " get it done next week so he can have his scheduled joint replacement.    Rubén Rush MD FACS  Professor of Surgery  Idris Mazariegos Chair in Surgical [place]  [facility] [employer] [employer]  [id number] [place] [address], [id number]  Phone [phone]  email: [email or web address]

## 2023-10-17 NOTE — H&P (VIEW-ONLY)
"History Of Present Illness  Becca Horvath is a 63 y.o. male presenting with right inguinal hernia.  He had a previous left knee replacement December.  CT scan prior to this did show small right inguinal hernia.  It is gotten larger since then.  Starting to bother her more.  He is planning another knee replacement this coming December.  He would like to have this hernia replaced before that other knee operation since during recovery the hernias been following.  He had a recent negative Cologuard.  He has no prostate symptoms.  No previous abdominal surgeries.  He has a history of heart disease he had stents in the past but he is followed closely by his cardiologist he has been able to stop his blood thinners prior to his knee replacement at the [facility].  Those records are easy to review at the [facility].  He knows to stop in the same way that he did for his knee replacement.  Past Medical History  He has no past medical history on file.    Surgical History  He has a past surgical history that includes Foot surgery (09/05/2017) and Other surgical history (09/05/2017).     Social History  He reports that he has never smoked. He has never been exposed to tobacco smoke. He has never used smokeless tobacco. He reports that he does not currently use drugs. He reports that he does not drink alcohol.     Allergies  Atorvastatin, Celecoxib, Diclofenac-misoprostol, and Lisinopril     physical examination he has a right inguinal hernia that is reducible.  No evidence of a left inguinal hernia.  No other abdominal incisions.  Last Recorded Vitals  Blood pressure 120/79, pulse 61, temperature 36.6 °C (97.9 °F), resp. rate 16, height 1.676 m (5' 6\"), weight 87.5 kg (193 lb).    Relevant Results reviewed the results from the [facility] in epic.        Assessment/Plan reviewed the hernia booklet with him.  Plan laparoscopic right inguinal hernia repair at his convenience.  He would like done soon we will try to " get it done next week so he can have his scheduled joint replacement.    Rubén Rush MD FACS  Professor of Surgery  Idris Mazariegos Chair in Surgical [place]  [facility] [employer]  [id number] [place] [address], [id number]  Phone [phone]  email: [email or web address]

## 2023-10-26 ENCOUNTER — ANESTHESIA EVENT (OUTPATIENT)
Dept: OPERATING ROOM | Facility: HOSPITAL | Age: 63
End: 2023-10-26
Payer: COMMERCIAL

## 2023-10-27 ENCOUNTER — TELEPHONE (OUTPATIENT)
Dept: PRIMARY CARE | Facility: CLINIC | Age: 63
End: 2023-10-27

## 2023-10-27 ENCOUNTER — ANESTHESIA (OUTPATIENT)
Dept: OPERATING ROOM | Facility: HOSPITAL | Age: 63
End: 2023-10-27
Payer: COMMERCIAL

## 2023-10-27 ENCOUNTER — HOSPITAL ENCOUNTER (OUTPATIENT)
Facility: HOSPITAL | Age: 63
Setting detail: OUTPATIENT SURGERY
Discharge: HOME | End: 2023-10-27
Attending: SURGERY | Admitting: SURGERY
Payer: COMMERCIAL

## 2023-10-27 VITALS
WEIGHT: 190.26 LBS | SYSTOLIC BLOOD PRESSURE: 121 MMHG | BODY MASS INDEX: 30.58 KG/M2 | OXYGEN SATURATION: 98 % | HEART RATE: 64 BPM | TEMPERATURE: 97.2 F | RESPIRATION RATE: 17 BRPM | DIASTOLIC BLOOD PRESSURE: 70 MMHG | HEIGHT: 66 IN

## 2023-10-27 DIAGNOSIS — K40.90 NON-RECURRENT UNILATERAL INGUINAL HERNIA WITHOUT OBSTRUCTION OR GANGRENE: Primary | ICD-10-CM

## 2023-10-27 DIAGNOSIS — K40.90 UNILATERAL INGUINAL HERNIA WITHOUT OBSTRUCTION OR GANGRENE, RECURRENCE NOT SPECIFIED: ICD-10-CM

## 2023-10-27 DIAGNOSIS — N40.0 BENIGN PROSTATIC HYPERPLASIA, UNSPECIFIED WHETHER LOWER URINARY TRACT SYMPTOMS PRESENT: ICD-10-CM

## 2023-10-27 PROCEDURE — 2500000005 HC RX 250 GENERAL PHARMACY W/O HCPCS: Performed by: SURGERY

## 2023-10-27 PROCEDURE — 3600000008 HC OR TIME - EACH INCREMENTAL 1 MINUTE - PROCEDURE LEVEL THREE: Performed by: SURGERY

## 2023-10-27 PROCEDURE — 2500000004 HC RX 250 GENERAL PHARMACY W/ HCPCS (ALT 636 FOR OP/ED): Performed by: ANESTHESIOLOGY

## 2023-10-27 PROCEDURE — A4217 STERILE WATER/SALINE, 500 ML: HCPCS | Performed by: SURGERY

## 2023-10-27 PROCEDURE — 7100000010 HC PHASE TWO TIME - EACH INCREMENTAL 1 MINUTE: Performed by: SURGERY

## 2023-10-27 PROCEDURE — 3700000001 HC GENERAL ANESTHESIA TIME - INITIAL BASE CHARGE: Performed by: SURGERY

## 2023-10-27 PROCEDURE — 2720000007 HC OR 272 NO HCPCS: Performed by: SURGERY

## 2023-10-27 PROCEDURE — 2500000001 HC RX 250 WO HCPCS SELF ADMINISTERED DRUGS (ALT 637 FOR MEDICARE OP): Performed by: ANESTHESIOLOGY

## 2023-10-27 PROCEDURE — A49650 PR LAP,INGUINAL HERNIA REPR,INITIAL: Performed by: ANESTHESIOLOGIST ASSISTANT

## 2023-10-27 PROCEDURE — 2500000004 HC RX 250 GENERAL PHARMACY W/ HCPCS (ALT 636 FOR OP/ED): Performed by: SURGERY

## 2023-10-27 PROCEDURE — 3600000003 HC OR TIME - INITIAL BASE CHARGE - PROCEDURE LEVEL THREE: Performed by: SURGERY

## 2023-10-27 PROCEDURE — 7100000009 HC PHASE TWO TIME - INITIAL BASE CHARGE: Performed by: SURGERY

## 2023-10-27 PROCEDURE — 2500000005 HC RX 250 GENERAL PHARMACY W/O HCPCS: Performed by: ANESTHESIOLOGIST ASSISTANT

## 2023-10-27 PROCEDURE — A49650 PR LAP,INGUINAL HERNIA REPR,INITIAL: Performed by: ANESTHESIOLOGY

## 2023-10-27 PROCEDURE — 7100000001 HC RECOVERY ROOM TIME - INITIAL BASE CHARGE: Performed by: SURGERY

## 2023-10-27 PROCEDURE — C1781 MESH (IMPLANTABLE): HCPCS | Performed by: SURGERY

## 2023-10-27 PROCEDURE — 2780000003 HC OR 278 NO HCPCS: Performed by: SURGERY

## 2023-10-27 PROCEDURE — 3700000002 HC GENERAL ANESTHESIA TIME - EACH INCREMENTAL 1 MINUTE: Performed by: SURGERY

## 2023-10-27 PROCEDURE — 49650 LAP ING HERNIA REPAIR INIT: CPT | Performed by: SURGERY

## 2023-10-27 PROCEDURE — 2500000004 HC RX 250 GENERAL PHARMACY W/ HCPCS (ALT 636 FOR OP/ED): Performed by: ANESTHESIOLOGIST ASSISTANT

## 2023-10-27 PROCEDURE — 7100000002 HC RECOVERY ROOM TIME - EACH INCREMENTAL 1 MINUTE: Performed by: SURGERY

## 2023-10-27 DEVICE — PATCH, MESH, MARLEX, 6 X 6 IN, POLYPROPYLENE: Type: IMPLANTABLE DEVICE | Site: ABDOMEN | Status: FUNCTIONAL

## 2023-10-27 RX ORDER — LIDOCAINE HYDROCHLORIDE 20 MG/ML
INJECTION, SOLUTION INFILTRATION; PERINEURAL AS NEEDED
Status: DISCONTINUED | OUTPATIENT
Start: 2023-10-27 | End: 2023-10-27

## 2023-10-27 RX ORDER — ALBUTEROL SULFATE 0.83 MG/ML
2.5 SOLUTION RESPIRATORY (INHALATION) ONCE AS NEEDED
Status: DISCONTINUED | OUTPATIENT
Start: 2023-10-27 | End: 2023-10-27 | Stop reason: HOSPADM

## 2023-10-27 RX ORDER — ONDANSETRON HYDROCHLORIDE 2 MG/ML
INJECTION, SOLUTION INTRAVENOUS AS NEEDED
Status: DISCONTINUED | OUTPATIENT
Start: 2023-10-27 | End: 2023-10-27

## 2023-10-27 RX ORDER — OXYCODONE HYDROCHLORIDE 5 MG/1
5 TABLET ORAL EVERY 6 HOURS PRN
Qty: 10 TABLET | Refills: 0 | Status: SHIPPED | OUTPATIENT
Start: 2023-10-27 | End: 2024-06-04 | Stop reason: WASHOUT

## 2023-10-27 RX ORDER — OXYCODONE HYDROCHLORIDE 5 MG/1
5 TABLET ORAL EVERY 4 HOURS PRN
Status: DISCONTINUED | OUTPATIENT
Start: 2023-10-27 | End: 2023-10-27 | Stop reason: HOSPADM

## 2023-10-27 RX ORDER — GLYCOPYRROLATE 0.2 MG/ML
INJECTION INTRAMUSCULAR; INTRAVENOUS AS NEEDED
Status: DISCONTINUED | OUTPATIENT
Start: 2023-10-27 | End: 2023-10-27

## 2023-10-27 RX ORDER — ONDANSETRON HYDROCHLORIDE 2 MG/ML
4 INJECTION, SOLUTION INTRAVENOUS ONCE AS NEEDED
Status: DISCONTINUED | OUTPATIENT
Start: 2023-10-27 | End: 2023-10-27 | Stop reason: HOSPADM

## 2023-10-27 RX ORDER — CEFAZOLIN 1 G/1
INJECTION, POWDER, FOR SOLUTION INTRAVENOUS AS NEEDED
Status: DISCONTINUED | OUTPATIENT
Start: 2023-10-27 | End: 2023-10-27

## 2023-10-27 RX ORDER — BUPIVACAINE HCL/EPINEPHRINE 0.5-1:200K
VIAL (ML) INJECTION AS NEEDED
Status: DISCONTINUED | OUTPATIENT
Start: 2023-10-27 | End: 2023-10-27 | Stop reason: HOSPADM

## 2023-10-27 RX ORDER — NORETHINDRONE AND ETHINYL ESTRADIOL 0.5-0.035
KIT ORAL AS NEEDED
Status: DISCONTINUED | OUTPATIENT
Start: 2023-10-27 | End: 2023-10-27

## 2023-10-27 RX ORDER — FENTANYL CITRATE 50 UG/ML
INJECTION, SOLUTION INTRAMUSCULAR; INTRAVENOUS AS NEEDED
Status: DISCONTINUED | OUTPATIENT
Start: 2023-10-27 | End: 2023-10-27

## 2023-10-27 RX ORDER — ROCURONIUM BROMIDE 10 MG/ML
INJECTION, SOLUTION INTRAVENOUS AS NEEDED
Status: DISCONTINUED | OUTPATIENT
Start: 2023-10-27 | End: 2023-10-27

## 2023-10-27 RX ORDER — SODIUM CHLORIDE, SODIUM LACTATE, POTASSIUM CHLORIDE, CALCIUM CHLORIDE 600; 310; 30; 20 MG/100ML; MG/100ML; MG/100ML; MG/100ML
100 INJECTION, SOLUTION INTRAVENOUS CONTINUOUS
Status: DISCONTINUED | OUTPATIENT
Start: 2023-10-27 | End: 2023-10-27 | Stop reason: HOSPADM

## 2023-10-27 RX ORDER — ACETAMINOPHEN 325 MG/1
650 TABLET ORAL EVERY 4 HOURS PRN
Status: DISCONTINUED | OUTPATIENT
Start: 2023-10-27 | End: 2023-10-27 | Stop reason: HOSPADM

## 2023-10-27 RX ORDER — SODIUM CHLORIDE 0.9 G/100ML
IRRIGANT IRRIGATION AS NEEDED
Status: DISCONTINUED | OUTPATIENT
Start: 2023-10-27 | End: 2023-10-27 | Stop reason: HOSPADM

## 2023-10-27 RX ORDER — MIDAZOLAM HYDROCHLORIDE 1 MG/ML
INJECTION, SOLUTION INTRAMUSCULAR; INTRAVENOUS AS NEEDED
Status: DISCONTINUED | OUTPATIENT
Start: 2023-10-27 | End: 2023-10-27

## 2023-10-27 RX ORDER — PROPOFOL 10 MG/ML
INJECTION, EMULSION INTRAVENOUS AS NEEDED
Status: DISCONTINUED | OUTPATIENT
Start: 2023-10-27 | End: 2023-10-27

## 2023-10-27 RX ORDER — ACETAMINOPHEN 500 MG
1000 TABLET ORAL EVERY 6 HOURS PRN
Qty: 30 TABLET | Refills: 0 | Status: SHIPPED | OUTPATIENT
Start: 2023-10-27

## 2023-10-27 RX ORDER — PHENYLEPHRINE HCL IN 0.9% NACL 1 MG/10 ML
SYRINGE (ML) INTRAVENOUS AS NEEDED
Status: DISCONTINUED | OUTPATIENT
Start: 2023-10-27 | End: 2023-10-27

## 2023-10-27 RX ADMIN — ONDANSETRON 4 MG: 2 INJECTION INTRAMUSCULAR; INTRAVENOUS at 09:55

## 2023-10-27 RX ADMIN — SUGAMMADEX 200 MG: 100 INJECTION, SOLUTION INTRAVENOUS at 10:37

## 2023-10-27 RX ADMIN — EPHEDRINE SULFATE 10 MG: 50 INJECTION, SOLUTION INTRAVENOUS at 10:29

## 2023-10-27 RX ADMIN — CEFAZOLIN SODIUM 2 G: 1 POWDER, FOR SOLUTION INTRAMUSCULAR; INTRAVENOUS at 09:50

## 2023-10-27 RX ADMIN — MIDAZOLAM HYDROCHLORIDE 2 MG: 1 INJECTION, SOLUTION INTRAMUSCULAR; INTRAVENOUS at 09:46

## 2023-10-27 RX ADMIN — FENTANYL CITRATE 50 MCG: 50 INJECTION, SOLUTION INTRAMUSCULAR; INTRAVENOUS at 10:37

## 2023-10-27 RX ADMIN — Medication 150 MCG: at 10:00

## 2023-10-27 RX ADMIN — Medication 100 MCG: at 09:55

## 2023-10-27 RX ADMIN — GLYCOPYRROLATE 0.2 MG: 0.2 INJECTION INTRAMUSCULAR; INTRAVENOUS at 10:00

## 2023-10-27 RX ADMIN — ROCURONIUM BROMIDE 50 MG: 10 INJECTION, SOLUTION INTRAVENOUS at 09:50

## 2023-10-27 RX ADMIN — HYDROMORPHONE HYDROCHLORIDE 0.5 MG: 1 INJECTION, SOLUTION INTRAMUSCULAR; INTRAVENOUS; SUBCUTANEOUS at 11:15

## 2023-10-27 RX ADMIN — PROPOFOL 150 MG: 10 INJECTION, EMULSION INTRAVENOUS at 09:50

## 2023-10-27 RX ADMIN — EPHEDRINE SULFATE 10 MG: 50 INJECTION, SOLUTION INTRAVENOUS at 10:03

## 2023-10-27 RX ADMIN — SODIUM CHLORIDE, SODIUM LACTATE, POTASSIUM CHLORIDE, AND CALCIUM CHLORIDE: 600; 310; 30; 20 INJECTION, SOLUTION INTRAVENOUS at 09:44

## 2023-10-27 RX ADMIN — HYDROMORPHONE HYDROCHLORIDE 0.5 MG: 1 INJECTION, SOLUTION INTRAMUSCULAR; INTRAVENOUS; SUBCUTANEOUS at 11:30

## 2023-10-27 RX ADMIN — OXYCODONE HYDROCHLORIDE 5 MG: 5 TABLET ORAL at 12:00

## 2023-10-27 RX ADMIN — Medication: at 10:42

## 2023-10-27 RX ADMIN — DEXAMETHASONE SODIUM PHOSPHATE 4 MG: 4 INJECTION, SOLUTION INTRAMUSCULAR; INTRAVENOUS at 09:55

## 2023-10-27 RX ADMIN — GLYCOPYRROLATE 0.2 MG: 0.2 INJECTION INTRAMUSCULAR; INTRAVENOUS at 10:10

## 2023-10-27 RX ADMIN — FENTANYL CITRATE 50 MCG: 50 INJECTION, SOLUTION INTRAMUSCULAR; INTRAVENOUS at 09:50

## 2023-10-27 RX ADMIN — LIDOCAINE HYDROCHLORIDE 80 MG: 20 INJECTION, SOLUTION INFILTRATION; PERINEURAL at 09:50

## 2023-10-27 RX ADMIN — EPHEDRINE SULFATE 10 MG: 50 INJECTION, SOLUTION INTRAVENOUS at 10:12

## 2023-10-27 RX ADMIN — HYDROMORPHONE HYDROCHLORIDE 0.5 MG: 1 INJECTION, SOLUTION INTRAMUSCULAR; INTRAVENOUS; SUBCUTANEOUS at 11:00

## 2023-10-27 ASSESSMENT — PAIN SCALES - GENERAL
PAINLEVEL_OUTOF10: 7
PAINLEVEL_OUTOF10: 4
PAINLEVEL_OUTOF10: 0 - NO PAIN
PAINLEVEL_OUTOF10: 3
PAINLEVEL_OUTOF10: 4
PAINLEVEL_OUTOF10: 4

## 2023-10-27 ASSESSMENT — PAIN - FUNCTIONAL ASSESSMENT
PAIN_FUNCTIONAL_ASSESSMENT: 0-10

## 2023-10-27 ASSESSMENT — COLUMBIA-SUICIDE SEVERITY RATING SCALE - C-SSRS
1. IN THE PAST MONTH, HAVE YOU WISHED YOU WERE DEAD OR WISHED YOU COULD GO TO SLEEP AND NOT WAKE UP?: NO
2. HAVE YOU ACTUALLY HAD ANY THOUGHTS OF KILLING YOURSELF?: NO
6. HAVE YOU EVER DONE ANYTHING, STARTED TO DO ANYTHING, OR PREPARED TO DO ANYTHING TO END YOUR LIFE?: NO

## 2023-10-27 NOTE — DISCHARGE INSTRUCTIONS
Discharge instruction after your surgical procedure    Your skin has been closed with dissolvable suture and covered with Steristrips  It is okay to shower tomorrow, but no soaking (swimming, bathtubs) until follow-up in clinic.  Take your postoperative pain medications as instructed, use Tylenol as your primary pain medication.  If still having breakthrough pain you can use the prescribed narcotic pain medication.  No weight restrictions, activity as tolerated.  No dietary restriction, advance your diet as tolerated.  Contact the clinic if you have a fever (over 101.5), purulent drainage from incisions, significant redness around the incision, sustained nausea or vomiting, or any other issues.  Follow-up in clinic in 2 weeks with Dr. Rush

## 2023-10-27 NOTE — ANESTHESIA PREPROCEDURE EVALUATION
Patient: Becca Horvath    Procedure Information       Date/Time: 10/27/23 1030    Procedure: Right Inguinal Hernia Repair; Laparoscopy (Right)    Location: [facility] A OR 05 / [facility] A OR    Surgeons: Rubén Rush MD            Relevant Problems   Cardiovascular   (+) Arteriosclerosis of coronary artery   (+) Coronary artery disease   (+) HTN (hypertension), benign   (+) Hyperlipidemia   (+) Stented coronary artery      Endocrine   (+) Hyponatremia   (+) Obesity      GI   (+) Esophageal reflux   (+) GERD (gastroesophageal reflux disease)      Neuro/Psych   (+) Cryptogenic stroke (CMS/HCC)   (+) Lumbar radiculopathy      Hematology   (+) Anemia      Musculoskeletal   (+) Primary osteoarthritis of right knee   (+) Spinal stenosis      Other   (+) Arthritis of both knees       Clinical information reviewed:   Tobacco  Allergies  Meds   Med Hx  Surg Hx   Fam Hx  Soc Hx        NPO/Void Status  Date of Last Liquid: 10/26/23  Date of Last Solid: 10/26/23           Past Medical History:   Diagnosis Date    Anemia     BPH (benign prostatic hyperplasia)     CAD S/P percutaneous coronary angioplasty 2017    multiple HARISH    GERD (gastroesophageal reflux disease)     HLD (hyperlipidemia)     HTN (hypertension)       Past Surgical History:   Procedure Laterality Date    FOOT SURGERY  09/05/2017    Foot Surgery    KNEE ARTHROPLASTY Left 06/2023    MOUTH SURGERY      OTHER SURGICAL HISTORY  09/05/2017    Ear Surgery    SINUS SURGERY       Social History     Tobacco Use    Smoking status: Never     Passive exposure: Never    Smokeless tobacco: Never   Vaping Use    Vaping Use: Never used   Substance Use Topics    Alcohol use: Never    Drug use: Not Currently      Current Outpatient Medications   Medication Instructions    amLODIPine (NORVASC) 5 mg, oral, Daily RT    clopidogrel (Plavix) 75 mg tablet 1 tablet, oral, Daily    doxazosin (CARDURA) 2 mg, oral, Nightly    ferrous sulfate 325 mg, oral, Daily with breakfast, Do not  "crush, chew, or split.    losartan (COZAAR) 100 mg, oral, Daily, Stop taking Hyzaar    metoprolol tartrate (Lopressor) 25 mg tablet 0.5 tablets, oral, Nightly    pantoprazole (PROTONIX) 40 mg, oral, Daily before breakfast, Do not crush, chew, or split.     rosuvastatin (Crestor) 40 mg tablet 1 tablet, oral, Nightly    tamsulosin (FLOMAX) 0.8 mg, oral, Nightly      Allergies   Allergen Reactions    Atorvastatin Anaphylaxis    Celecoxib Anaphylaxis    Diclofenac-Misoprostol Anaphylaxis    Lisinopril Anaphylaxis        Chemistry    Lab Results   Component Value Date/Time     (L) 05/02/2023 1510    K 3.8 05/02/2023 1510    CL 95 (L) 05/02/2023 1510    CO2 25 05/02/2023 1510    BUN 18 05/02/2023 1510    CREATININE 0.77 05/02/2023 1510    Lab Results   Component Value Date/Time    CALCIUM 9.1 05/02/2023 1510    ALKPHOS 52 05/02/2023 1510    AST 15 05/02/2023 1510    ALT 15 05/02/2023 1510    BILITOT 0.4 05/02/2023 1510          Lab Results   Component Value Date/Time    WBC 5.4 05/02/2023 1510    HGB 12.1 (L) 05/02/2023 1510    HCT 35.4 (L) 05/02/2023 1510     05/02/2023 1510     No results found for: \"PROTIME\", \"PTT\", \"INR\"  No results found for this or any previous visit (from the past 4464 hour(s)).  No results found for this or any previous visit from the past 1095 days.       Visit Vitals  /72   Pulse 50   Temp 36.7 °C (98.1 °F)   Resp 16   Ht 1.676 m (5' 6\")   Wt 86.3 kg (190 lb 4.1 oz)   SpO2 99%   BMI 30.71 kg/m²   Smoking Status Never   BSA 2 m²        Anesthesia Evaluation      No history of anesthetic complications   Airway   Mallampati: III  TM distance: >3 FB  Neck ROM: full  Dental - normal exam     Pulmonary     breath sounds clear to auscultation  Cardiovascular     Rhythm: regular  Rate: normal    Neuro/Psych      GI/Hepatic/Renal      Endo/Other    Abdominal  - normal exam                      Physical Exam    Airway  Mallampati: III  TM distance: >3 FB  Neck ROM: full   "   Cardiovascular   Rhythm: regular  Rate: normal     Dental - normal exam     Pulmonary   Breath sounds clear to auscultation     Abdominal - normal exam              Anesthesia Plan    ASA 3     general   (No prior anesthesia complications.)  intravenous induction   Postoperative administration of opioids is intended.  Anesthetic plan and risks discussed with patient.

## 2023-10-27 NOTE — ANESTHESIA PROCEDURE NOTES
Airway  Date/Time: 10/27/2023 9:53 AM  Urgency: elective    Airway not difficult    Staffing  Performed: HERMANN   Authorized by: Ivan Sprague MD    Performed by: ZOE Lamas  Patient location during procedure: OR    Indications and Patient Condition  Indications for airway management: anesthesia and airway protection  Spontaneous ventilation: present  Sedation level: deep  Preoxygenated: yes  Patient position: sniffing  Mask difficulty assessment: 3 - difficult mask (inadequate, unstable or two providers) +/- NMBA  Planned trial extubation    Final Airway Details  Final airway type: endotracheal airway      Successful airway: ETT  Cuffed: yes   Successful intubation technique: direct laryngoscopy  Facilitating devices/methods: intubating stylet  Endotracheal tube insertion site: oral  Blade: Minal  Blade size: #4  ETT size (mm): 7.5  Cormack-Lehane Classification: grade I - full view of glottis  Placement verified by: chest auscultation and capnometry   Cuff volume (mL): 7  Measured from: lips  ETT to lips (cm): 23  Number of attempts at approach: 1    Additional Comments  atraumatic

## 2023-10-27 NOTE — OP NOTE
Right Inguinal Hernia Repair; Laparoscopy (R) Operative Note     Date: 10/27/2023  OR Location: [facility] A OR    Name: Becca Horvath, : 1960, Age: 63 y.o., MRN: [id number], Sex: male    Diagnosis  Pre-op Diagnosis     * Unilateral inguinal hernia without obstruction or gangrene, recurrence not specified [K40.90] Post-op Diagnosis     * Unilateral inguinal hernia without obstruction or gangrene, recurrence not specified [K40.90]     Procedures  Right Inguinal Hernia Repair; Laparoscopy  02455 - [facility] LAPAROSCOPY SURG RPR INITIAL INGUINAL HERNIA      Surgeons      * Rubén Rush - Primary    Resident/Fellow/Other Assistant:  Surgeon(s) and Role:    Procedure Summary  Anesthesia: General  ASA: III  Anesthesia Staff: Anesthesiologist: Ivan Sprague MD  C-AA: ZOE Lamas  Estimated Blood Loss: 0mL  Intra-op Medications:   Medication Name Total Dose   BUPivacaine-EPINEPHrine (Marcaine w/EPI) 0.5 %-1:200,000 injection 13 mL              Anesthesia Record               Intraprocedure I/O Totals          Intake    .00 mL    Total Intake 800 mL       Output    Est. Blood Loss 10 mL    Total Output 10 mL       Net    Net Volume 790 mL          Specimen: No specimens collected     Staff:   Circulator: Amy Castillo RN; Lara Luna RN  Scrub Person: Karla Lopez         Drains and/or Catheters: * None in log *    Tourniquet Times:         Implants:  Implants       Type Name Action Serial No.      Surgical Mesh Sling Implant PATCH, MESH, MARLEX, 6 X 6 IN, POLYPROPYLENE - [id number] Implanted               Findings: Indirect inguinal hernia    Indications: Becca Horvath is an 63 y.o. male who is having surgery for Unilateral inguinal hernia without obstruction or gangrene, recurrence not specified [K40.90].     The patient was seen in the preoperative area. The risks, benefits, complications, treatment options, non-operative alternatives, expected recovery and outcomes were discussed with the patient. The possibilities of  reaction to medication, pulmonary aspiration, injury to surrounding structures, bleeding, recurrent infection, the need for additional procedures, failure to diagnose a condition, and creating a complication requiring transfusion or operation were discussed with the patient. The patient concurred with the proposed plan, giving informed consent.  The site of surgery was properly noted/marked if necessary per policy. The patient has been actively warmed in preoperative area. P    Procedure Details: Patient brought operating room general anesthesia performed.  Patient abdomen is prepped and draped.  Vertical incision at the umbilicus was made.  The anterior rectus fascia was opened.  Rectus muscle retracted.  Balloon dissector placed to pubic tubercle this is opened up.  Then we placed this John trocar.  2 5 Hein trocars placed midline.  Initially checked on the left side there is no hernia.  On the right side a large indirect inguinal hernia.  This was reduced in its entirety.  This cord structure peritonealized.  A 3.5 x 6 inch piece of mesh Bard was placed.  It was fixated with Absorbatac .  It laid nicely.  Removed our trocars.  Closed the fascial defect umbilicus with 0 Vicryl.  Skin was closed with 4-0 Vicryl.  Steri-Strips were placed.  Complications:  None; patient tolerated the procedure well.    Disposition: PACU - hemodynamically stable.  Condition: stable         Attending Attestation: I was present and scrubbed for the entire procedure.    Rubén Rush  Phone Number: [phone]

## 2023-10-27 NOTE — PERIOPERATIVE NURSING NOTE
1215: Phase 2 begin  1238: Discharge instructions reviewed with pt and sister in room  1245: Fluids increased to promote post-op void  1400: Pt successfully voided, getting changed

## 2023-10-27 NOTE — ANESTHESIA POSTPROCEDURE EVALUATION
Patient: Becac Horvath    Procedure Summary       Date: 10/27/23 Room / [facility]    Anesthesia Start: 0944 Anesthesia Stop: 1045    Procedure: Right Inguinal Hernia Repair; Laparoscopy (Right) Diagnosis:       Unilateral inguinal hernia without obstruction or gangrene, recurrence not specified      (Unilateral inguinal hernia without obstruction or gangrene, recurrence not specified [K40.90])    Surgeons: Rubén Rush MD Responsible Provider: Ivan Sprague MD    Anesthesia Type: general ASA Status: 3            Anesthesia Type: general    Vitals Value Taken Time   /53 10/27/23 1213   Temp 36.2 °C (97.2 °F) 10/27/23 1042   Pulse 70 10/27/23 1213   Resp 16 10/27/23 1200   SpO2 97 % 10/27/23 1213   Vitals shown include unvalidated device data.    Anesthesia Post Evaluation    Patient location during evaluation: bedside  Patient participation: complete - patient participated  Level of consciousness: awake and alert  Pain management: satisfactory to patient  Airway patency: patent  Cardiovascular status: acceptable  Respiratory status: acceptable  Hydration status: acceptable        No notable events documented.

## 2023-10-31 ENCOUNTER — CLINICAL SUPPORT (OUTPATIENT)
Dept: PRIMARY CARE | Facility: CLINIC | Age: 63
End: 2023-10-31
Payer: COMMERCIAL

## 2023-11-06 RX ORDER — TAMSULOSIN HYDROCHLORIDE 0.4 MG/1
0.8 CAPSULE ORAL NIGHTLY
Qty: 90 CAPSULE | Refills: 0 | Status: SHIPPED | OUTPATIENT
Start: 2023-11-06 | End: 2024-01-02 | Stop reason: SDUPTHER

## 2023-11-09 ENCOUNTER — OFFICE VISIT (OUTPATIENT)
Dept: SURGERY | Facility: CLINIC | Age: 63
End: 2023-11-09
Payer: COMMERCIAL

## 2023-11-09 VITALS
SYSTOLIC BLOOD PRESSURE: 97 MMHG | BODY MASS INDEX: 30.83 KG/M2 | WEIGHT: 191 LBS | HEART RATE: 71 BPM | DIASTOLIC BLOOD PRESSURE: 57 MMHG

## 2023-11-09 DIAGNOSIS — D64.9 ANEMIA, UNSPECIFIED: ICD-10-CM

## 2023-11-09 PROCEDURE — 3078F DIAST BP <80 MM HG: CPT | Performed by: SURGERY

## 2023-11-09 PROCEDURE — 1036F TOBACCO NON-USER: CPT | Performed by: SURGERY

## 2023-11-09 PROCEDURE — 99024 POSTOP FOLLOW-UP VISIT: CPT | Performed by: SURGERY

## 2023-11-09 PROCEDURE — 3074F SYST BP LT 130 MM HG: CPT | Performed by: SURGERY

## 2023-11-09 ASSESSMENT — PAIN SCALES - GENERAL: PAINLEVEL: 0-NO PAIN

## 2023-11-09 NOTE — PROGRESS NOTES
History Of Present Illness  Becca Horvath is a 63 y.o. male presenting after laparoscopic hernia repair.  He is doing well.  His pain is well controlled.  He is radiated to work next week.      Last Recorded Vitals  Blood pressure 97/57, pulse 71, weight 86.6 kg (191 lb).  Physical Exam of the incisions.  No evidence of any hernia recurrence      Assessment/Plan   He has done well from his laparoscopic hernia repair.  He can resume to work next week without any limitations.  He will follow-up me as needed.    Rubén Rush MD FACS  Professor of Surgery  Idris Mazariegos Chair in Surgical [place]  [facility] [employer]  [address] [place] [address], [phone]  Phone [phone]  email: [email or web address]

## 2023-11-09 NOTE — LETTER
November 9, 2023     Patient: Becca Horvath   YOB: 1960   Date of Visit: 11/9/2023       To Whom It May Concern:    It is my medical opinion that Becca Horvath may return to work on 11/13/2023 .    If you have any questions or concerns, please don't hesitate to call.         Sincerely,        Rubén Rush MD    CC:   No Recipients

## 2023-12-06 ENCOUNTER — OFFICE VISIT (OUTPATIENT)
Dept: PRIMARY CARE | Facility: CLINIC | Age: 63
End: 2023-12-06
Payer: COMMERCIAL

## 2023-12-06 VITALS
BODY MASS INDEX: 30.83 KG/M2 | RESPIRATION RATE: 16 BRPM | SYSTOLIC BLOOD PRESSURE: 118 MMHG | DIASTOLIC BLOOD PRESSURE: 78 MMHG | HEART RATE: 62 BPM | TEMPERATURE: 98 F | WEIGHT: 191 LBS

## 2023-12-06 DIAGNOSIS — L25.8 UNSPECIFIED CONTACT DERMATITIS DUE TO OTHER AGENTS: ICD-10-CM

## 2023-12-06 DIAGNOSIS — M25.562 ARTHRALGIA OF BOTH KNEES: Primary | ICD-10-CM

## 2023-12-06 DIAGNOSIS — I25.10 CORONARY ARTERY DISEASE INVOLVING NATIVE HEART, UNSPECIFIED VESSEL OR LESION TYPE, UNSPECIFIED WHETHER ANGINA PRESENT: ICD-10-CM

## 2023-12-06 DIAGNOSIS — I10 HTN (HYPERTENSION), BENIGN: ICD-10-CM

## 2023-12-06 DIAGNOSIS — M25.561 ARTHRALGIA OF BOTH KNEES: Primary | ICD-10-CM

## 2023-12-06 PROCEDURE — 99212 OFFICE O/P EST SF 10 MIN: CPT | Performed by: INTERNAL MEDICINE

## 2023-12-06 PROCEDURE — 1036F TOBACCO NON-USER: CPT | Performed by: INTERNAL MEDICINE

## 2023-12-06 PROCEDURE — 3078F DIAST BP <80 MM HG: CPT | Performed by: INTERNAL MEDICINE

## 2023-12-06 PROCEDURE — 3074F SYST BP LT 130 MM HG: CPT | Performed by: INTERNAL MEDICINE

## 2023-12-06 RX ORDER — MUPIROCIN 20 MG/G
OINTMENT TOPICAL
COMMUNITY
Start: 2023-11-27 | End: 2024-06-04 | Stop reason: WASHOUT

## 2023-12-06 ASSESSMENT — ENCOUNTER SYMPTOMS
LIGHT-HEADEDNESS: 0
ACTIVITY CHANGE: 1
ARTHRALGIAS: 1
APPETITE CHANGE: 1
BACK PAIN: 1
HEADACHES: 0

## 2023-12-06 ASSESSMENT — PAIN SCALES - GENERAL: PAINLEVEL: 0-NO PAIN

## 2023-12-06 NOTE — PROGRESS NOTES
Subjective    Becca Horvath is a 63 y.o. male who presents for  follow up.  Concerned about L knee dermatitis.  Patient is due to surgery 12/14/2023  for R knee replacement.  He is here for paper work.    HPI    This is a 63 years Old man with medical History of HTN, Hyperlipidemia, GERD, CAD, s/p PCI placement with 4 stents placement, anemia, bilateral knee osteoarthritis, BPH, s/p  L TKR 6/7/2023.  Patient is presented for evaluation and treatment of the above complaints.   Still has R  knee pain, followed by orthopedic surgery.  Scheduled to have surgery 12/14/2023.   Blood pressure is well controlled.  Off  hydrochlorothiazide.     Review of Systems   Constitutional:  Positive for activity change and appetite change.   Musculoskeletal:  Positive for arthralgias and back pain.   Neurological:  Negative for light-headedness and headaches.       Objective        Vitals:    12/06/23 1559   BP: 118/78   Pulse: 62   Resp: 16   Temp: 36.7 °C (98 °F)        Physical Exam  HENT:      Head: Normocephalic.      Nose: Nose normal.   Cardiovascular:      Rate and Rhythm: Normal rate and regular rhythm.   Abdominal:      Palpations: Abdomen is soft.   Musculoskeletal:         General: Tenderness present.   Skin:     Findings: Rash present.   Neurological:      General: No focal deficit present.      Mental Status: He is alert. Mental status is at baseline.   Psychiatric:         Mood and Affect: Mood normal.       Diagnoses and all orders for this visit:  Arthralgia of both knees (Primary)  Coronary artery disease involving native heart, unspecified vessel or lesion type, unspecified whether angina present  HTN (hypertension), benign  Unspecified contact dermatitis due to other agents     Had trauma to L knee.   Had previously had hematoma, healing now, has dermatitis.  Use bacitracin.    R knee pain.  Scheduled for replacement 12/ 14/2023.   Patient will stop plavix 1 week prior.    Left knee pain.  S/p  L Knee replacement  6/7/2023.  Had completed  PT.    -Coronary artery disease.  History of stent placements 6 years ago.  Will obtain records.  Control risk factors.  Take medications as directed.         -Hyperlipidemia.  Taking rosuvastatin 40 mg at nighttime.  Keep Mediterranean diet, exercise.     -BPH.  Taking doxazosin.  PSA.     -Gastroesophageal reflux disease.  Eat small portions.  Avoid Caffeine, spicy foods, chocolate, alcohol.  Do not wear tight clothes.  Keep last meal before bed time > 3 hours.  Keep head side of the bed elevated at all time.     -Health maintenance.  Return for complete physical exam.  Colonoscopy.  Ophthalmology.  Influenza vaccination.     - Class 2 Obesity with BMI 30.83.  Diet, exercise.  Keep ideal BMI less than 25.      Suri Negro MD

## 2023-12-18 DIAGNOSIS — K21.9 GASTROESOPHAGEAL REFLUX DISEASE, UNSPECIFIED WHETHER ESOPHAGITIS PRESENT: ICD-10-CM

## 2023-12-18 RX ORDER — PANTOPRAZOLE SODIUM 40 MG/1
40 TABLET, DELAYED RELEASE ORAL
Qty: 90 TABLET | Refills: 3 | Status: SHIPPED | OUTPATIENT
Start: 2023-12-18 | End: 2024-03-17

## 2024-01-02 DIAGNOSIS — N40.0 BENIGN PROSTATIC HYPERPLASIA, UNSPECIFIED WHETHER LOWER URINARY TRACT SYMPTOMS PRESENT: ICD-10-CM

## 2024-01-02 RX ORDER — TAMSULOSIN HYDROCHLORIDE 0.4 MG/1
0.8 CAPSULE ORAL NIGHTLY
Qty: 90 CAPSULE | Refills: 3 | Status: SHIPPED | OUTPATIENT
Start: 2024-01-02 | End: 2024-01-02 | Stop reason: SDUPTHER

## 2024-01-02 RX ORDER — TAMSULOSIN HYDROCHLORIDE 0.4 MG/1
0.8 CAPSULE ORAL NIGHTLY
Qty: 180 CAPSULE | Refills: 3 | Status: SHIPPED | OUTPATIENT
Start: 2024-01-02 | End: 2024-04-01

## 2024-01-24 DIAGNOSIS — I10 HTN (HYPERTENSION), BENIGN: Primary | ICD-10-CM

## 2024-01-24 RX ORDER — METOPROLOL TARTRATE 25 MG/1
12.5 TABLET, FILM COATED ORAL NIGHTLY
Qty: 90 TABLET | Refills: 0 | Status: SHIPPED | OUTPATIENT
Start: 2024-01-24

## 2024-02-22 DIAGNOSIS — I25.10 CORONARY ARTERY DISEASE INVOLVING NATIVE HEART, UNSPECIFIED VESSEL OR LESION TYPE, UNSPECIFIED WHETHER ANGINA PRESENT: Primary | ICD-10-CM

## 2024-02-22 RX ORDER — CLOPIDOGREL BISULFATE 75 MG/1
75 TABLET ORAL DAILY
Qty: 90 TABLET | Refills: 0 | Status: SHIPPED | OUTPATIENT
Start: 2024-02-22 | End: 2024-05-28 | Stop reason: SDUPTHER

## 2024-03-20 NOTE — PROGRESS NOTES
Spoke to patient who states he will contact 's office for scheduling as he is a established patient of his. Previously scheduled Cardiology appointment has been cancelled per patient request.   Subjective     Chief complaint is L knee pain, unable to ambulate.  Has difficulty to ambulate,using crutches.  Complaining of left knee swelling.     HPI    This is a 62 years Old man with medical History of HTN, Hyperlipidemia, GERD, CAD, s/p PCI placement with 4 stents placement, anemia, bilateral knee osteoarthritis, BPH.  Patient is  seen  by Dr Nessa Mcgill, has had MRI done 2/13/2023.  MRI showed advanced Tricompartmental osteoarthrosis, most pronounced over the medial femorotibial compartment.  Moderate volume effusion with synovitis.  High-grade sprain of the medial collateral ligament component of the meniscal femoral ligament.  Suspect subacute to remote low to intermediate grade sprain or superficial component of medial collateral ligament.  Complex medial and lateral meniscal tears as above.  Patient was started on 15 mg of prednisone with recommendation to taper it off.  No significant difference.  Has difficulty to ambulate, difficulty to change his position, using crutches.  Patient was advised to return to see orthopedic surgery, when inflammation we will get down.    Seen  also by Dr Medina 3/4/2023.     Followed by cardiology.        Review of Systems   All other systems reviewed and are negative.      Objective      Visit Vitals  Temp 36.4 °C (97.5 °F)        Physical Exam  Constitutional:       Appearance: Normal appearance.   HENT:      Head: Normocephalic and atraumatic.      Right Ear: Tympanic membrane normal.      Left Ear: Tympanic membrane normal.      Nose: Nose normal.      Mouth/Throat:      Mouth: Mucous membranes are moist.   Eyes:      Pupils: Pupils are equal, round, and reactive to light.   Cardiovascular:      Rate and Rhythm: Normal rate and regular rhythm.      Pulses: Normal pulses.   Pulmonary:      Effort: Pulmonary effort is normal.      Breath sounds: Normal breath sounds.   Abdominal:      Palpations: Abdomen is soft.   Musculoskeletal:      Comments: L knee swollen,  tender to palpation, has decrease in ROM.   Skin:     General: Skin is warm and dry.   Neurological:      General: No focal deficit present.      Mental Status: He is alert.   Psychiatric:         Mood and Affect: Mood normal.          Problem List Items Addressed This Visit          Circulatory    Heart disease       Digestive    Esophageal reflux - Primary    Relevant Medications    pantoprazole (Protonix) 40 mg EC tablet       Other    Arthralgia of both knees     Other Visit Diagnoses       Left knee pain, unspecified chronicity        Relevant Medications    predniSONE (Deltasone) 20 mg tablet    Pain and swelling of knee, left                Assessment and plan.    -Left knee pain, swelling.  MRI showed advanced Tricompartmental osteoarthrosis, most pronounced over the medial femorotibial compartment.  Moderate volume effusion with synovitis.  High-grade sprain of the medial collateral ligament component of the meniscal femoral ligament.  Suspect subacute to remote low to intermediate grade sprain or superficial component of medial collateral ligament.  Complex medial and lateral meniscal tears as above.  Take steroids as directed.  Taper down every 3 days.     -Coronary artery disease.  History of stent placements 6 years ago.  Will obtain records.  Control risk factors.  Take medications as directed.     -Hypertension.  Well-controlled.  Avoid salt, exercise.     -Hyperlipidemia.  Taking rosuvastatin 40 mg at nighttime.  Keep Mediterranean diet, exercise.     -BPH.  Taking doxazosin.  PSA.     -Gastroesophageal reflux disease.  Eat small portions.  Avoid Caffeine, spicy foods, chocolate, alcohol.  Do not wear tight clothes.  Keep last meal before bed time > 3 hours.  Keep head side of the bed elevated at all time.     -Health maintenance.  Return for complete physical exam.  Colonoscopy.  Ophthalmology.  Influenza vaccination today.     - Class 2 Obesity with BMI 36.32.  Diet, exercise.  Keep ideal BMI less  than 25.      Suri Negro MD

## 2024-04-08 DIAGNOSIS — I10 HTN (HYPERTENSION), BENIGN: ICD-10-CM

## 2024-04-08 RX ORDER — LOSARTAN POTASSIUM 100 MG/1
100 TABLET ORAL DAILY
Qty: 90 TABLET | Refills: 0 | Status: SHIPPED | OUTPATIENT
Start: 2024-04-08

## 2024-05-28 DIAGNOSIS — I25.10 CORONARY ARTERY DISEASE INVOLVING NATIVE HEART, UNSPECIFIED VESSEL OR LESION TYPE, UNSPECIFIED WHETHER ANGINA PRESENT: ICD-10-CM

## 2024-05-28 RX ORDER — CLOPIDOGREL BISULFATE 75 MG/1
75 TABLET ORAL DAILY
Qty: 90 TABLET | Refills: 0 | Status: SHIPPED | OUTPATIENT
Start: 2024-05-28 | End: 2024-05-30 | Stop reason: SDUPTHER

## 2024-05-30 RX ORDER — CLOPIDOGREL BISULFATE 75 MG/1
75 TABLET ORAL DAILY
Qty: 90 TABLET | Refills: 3 | Status: SHIPPED | OUTPATIENT
Start: 2024-05-30

## 2024-06-04 ENCOUNTER — OFFICE VISIT (OUTPATIENT)
Dept: PRIMARY CARE | Facility: CLINIC | Age: 64
End: 2024-06-04
Payer: COMMERCIAL

## 2024-06-04 VITALS
DIASTOLIC BLOOD PRESSURE: 62 MMHG | HEIGHT: 66 IN | TEMPERATURE: 97.4 F | RESPIRATION RATE: 16 BRPM | BODY MASS INDEX: 31.18 KG/M2 | HEART RATE: 64 BPM | WEIGHT: 194 LBS | SYSTOLIC BLOOD PRESSURE: 122 MMHG

## 2024-06-04 DIAGNOSIS — M25.532 LEFT WRIST PAIN: Primary | ICD-10-CM

## 2024-06-04 PROCEDURE — 3074F SYST BP LT 130 MM HG: CPT | Performed by: INTERNAL MEDICINE

## 2024-06-04 PROCEDURE — 1036F TOBACCO NON-USER: CPT | Performed by: INTERNAL MEDICINE

## 2024-06-04 PROCEDURE — 3078F DIAST BP <80 MM HG: CPT | Performed by: INTERNAL MEDICINE

## 2024-06-04 PROCEDURE — 99213 OFFICE O/P EST LOW 20 MIN: CPT | Performed by: INTERNAL MEDICINE

## 2024-06-04 RX ORDER — TAMSULOSIN HYDROCHLORIDE 0.4 MG/1
CAPSULE ORAL
COMMUNITY
Start: 2024-04-07

## 2024-06-04 RX ORDER — METHYLPREDNISOLONE 4 MG/1
TABLET ORAL
Qty: 21 TABLET | Refills: 0 | Status: SHIPPED | OUTPATIENT
Start: 2024-06-04 | End: 2024-06-11

## 2024-06-04 ASSESSMENT — ENCOUNTER SYMPTOMS
DEPRESSION: 0
APPETITE CHANGE: 0
ACTIVITY CHANGE: 0
OCCASIONAL FEELINGS OF UNSTEADINESS: 0
LOSS OF SENSATION IN FEET: 0
ARTHRALGIAS: 1
FATIGUE: 0

## 2024-06-04 ASSESSMENT — PATIENT HEALTH QUESTIONNAIRE - PHQ9
1. LITTLE INTEREST OR PLEASURE IN DOING THINGS: NOT AT ALL
2. FEELING DOWN, DEPRESSED OR HOPELESS: NOT AT ALL
SUM OF ALL RESPONSES TO PHQ9 QUESTIONS 1 AND 2: 0

## 2024-06-04 ASSESSMENT — PAIN SCALES - GENERAL: PAINLEVEL: 0-NO PAIN

## 2024-06-04 NOTE — PROGRESS NOTES
Subjective    Becca Horvath is a 63 y.o. male who presents for follow up after ED visit 5/23/2024.  Has L wrist pain, wearing splint.    HPI    This is a 63 years Old man with medical History of HTN, Hyperlipidemia, GERD, CAD, s/p PCI placement with 4 stents placement, anemia, bilateral knee osteoarthritis, BPH, s/p  L TKR 6/7/2023, s/p RTR 12/14/2024.  Patient is presented for evaluation and treatment of the above complaints.  Seen by emergency department 5/23/2024 for swelling, pain in left wrist.  Has had x-ray done, showed severe osteoarthritis at the distal radial ulnar joint.  Moderate osteophyte formation of the thumb CMC and triscaphe joints.  Advised to take Tylenol and wear the wrist splint.   Blood pressure is well controlled.  Off  hydrochlorothiazide.     Review of Systems   Constitutional:  Negative for activity change, appetite change and fatigue.   Musculoskeletal:  Positive for arthralgias.       Objective        Vitals:    06/04/24 1504   BP: 122/62   Pulse: 64   Resp: 16   Temp: 36.3 °C (97.4 °F)        Physical Exam  HENT:      Head: Normocephalic.      Mouth/Throat:      Mouth: Mucous membranes are moist.   Cardiovascular:      Rate and Rhythm: Normal rate and regular rhythm.      Heart sounds: Normal heart sounds.   Pulmonary:      Breath sounds: Normal breath sounds.   Abdominal:      Palpations: Abdomen is soft.   Musculoskeletal:         General: Tenderness present. Normal range of motion.   Skin:     General: Skin is warm.   Neurological:      Mental Status: He is alert. Mental status is at baseline.       Diagnoses and all orders for this visit:  Left wrist pain (Primary)  -     methylPREDNISolone (Medrol Dospak) 4 mg tablets; Take as directed on package.  -     Referral to Orthopaedic Surgery; Future     S/p ED visit for L wrist pain 5/23/2024.  Started on Tylenol.  Wrist splint.  Medrol pack, see orthopedic surgery.    S/p R knee TKR 12/14/2023.  Has a good results.    S/p  L Knee  replacement 6/7/2023.  Stable.     -Coronary artery disease.  History of stent placements 6 years ago.  Control risk factors.  Take medications as directed.         -Hyperlipidemia.  Taking rosuvastatin 40 mg at nighttime.  Keep Mediterranean diet, exercise.     -BPH.  Taking doxazosin.  PSA.     -Gastroesophageal reflux disease.  Eat small portions.  Avoid Caffeine, spicy foods, chocolate, alcohol.  Do not wear tight clothes.  Keep last meal before bed time > 3 hours.  Keep head side of the bed elevated at all time.     -Health maintenance.  Return for complete physical exam.  Colonoscopy.  Ophthalmology.  Influenza vaccination.     - Class 2 Obesity with BMI 31.31.  Diet, exercise.  Keep ideal BMI less than 25.     Suri Negro MD

## 2024-06-07 ENCOUNTER — TELEPHONE (OUTPATIENT)
Dept: PRIMARY CARE | Facility: CLINIC | Age: 64
End: 2024-06-07
Payer: COMMERCIAL

## 2024-06-07 NOTE — TELEPHONE ENCOUNTER
Patient is in need for letter to his work regarding left wrist pain.  He is in need for clarification of restrictions.

## 2024-06-07 NOTE — LETTER
June 7, 2024     Beccamikayla Horvath    Patient: Becca Horvath   YOB: 1960   Date of Visit: 6/7/2024       To whom it may concern,    Re: Becca Horvath.    This is to verify, that  RobbieYuliet Ji was seen in my office 6/4/2024 for left wrist pain.  Patient advised to wear  L wrist splint and avoid lifting more than 10 pounds.    Should you have any questions, please do not hesitate to call,      Sincerely,     Suri Negro MD      CC: No Recipients  ______________________________________________________________________________________

## 2024-07-01 NOTE — PROGRESS NOTES
[facility]  Hand and Upper Extremity Service  Initial evaluation / Consultation         Consult requested by Referring Physician: Dr. Negro     Chief Complaint: Left wrist pain        63 y.o right hand dominant  presenting for left wrist pain. He injured his left hand in mid May and was seen at [facility] where xrays revealed arthritic changes throughout the wrist and hand. He subsequently followed up with his PCP who referred him here today. He's continuing to work and reports his symptoms have improved but not completely resolved. He localizes the pain to the base of his thumb and aggravated by pinching type maneuvers.            Please refer to New Patient Intake Form scanned into patient's electronic record for self reported past medical history, past surgical history, medications, allergies, family history, social history and 10 point review of systems    Examination:  Constitutional: Oriented to person, place, and time.  Appears well-developed and well-nourished.  Head: Normocephalic and atraumatic.  Eyes: Pupils are equal, round, and reactive to light.  Cardiovascular: Intact distal pulses.  Pulmonary/Chest/Breast: Effort normal. No respiratory distress.  Neurological: Alert and oriented to person, place, and time.  Skin: Skin is warm and dry.  Psychiatric: normal mood and affect.  Behavior is normal.  Musculoskeletal: Left hand reveals minimal degenerative changes. Full forearm range of motion without DRUJ instability. No tenderness to palpation over DRUJ. Excellent wrist range of motion and full digital range of motion. Positive thumb CMC grind test with pain. Mild tenderness to palpation over STT joint.        Personal Interpretation of Diagnostic studies: Xrays of left wrist taken today demonstrate arthritic changes present at the DRUJ. Moderately severe pantrapezial arthritic changes.        Impression:  Left thumb CMC arthritis       Plan: He has DRUJ  arthritis on his xrays but this doesn't correlate with any symptoms or physical exam findings. We've discussed treatment options for his thumb pain but he believes his symptoms are improving and he's declined injections today. We gave him a Comfort Cool splint he can wear as needed at home and at work. He would like to be treated conservatively and should his symptoms persist then he can return as needed and if he has interest in a steroid injection.       Patient was prescribed a Comfort Cool for CMC arthritis. The patient has weakness, instability and/or deformity of their left thumb which requires stabilization from this orthosis to improve their function.      Verbal and written instructions for the use, wear schedule, cleaning and application of this item were given.  Patient was instructed that should the brace result in increased pain, decreased sensation, increased swelling, or an overall worsening of their medical condition, to please contact our office immediately.     Orthotic management and training was provided for skin care, modifications due to healing tissues, edema changes, interruption in skin integrity, and safety precautions with the orthosis.      Follow up: As needed              Mauricio Cardozo MD  [facility]  Department of Orthopaedic Surgery  Hand and Upper Extremity Reconstruction      Scribe Attestation  By signing my name below, I, Samina Aquino , [place]   attest that this documentation has been prepared under the direction and in the presence of Dr. Mauricio Cardozo.      Dictation performed with the use of voice recognition software.  Syntax and grammatical errors may exist.

## 2024-07-02 ENCOUNTER — OFFICE VISIT (OUTPATIENT)
Dept: ORTHOPEDIC SURGERY | Facility: HOSPITAL | Age: 64
End: 2024-07-02
Payer: COMMERCIAL

## 2024-07-02 ENCOUNTER — HOSPITAL ENCOUNTER (OUTPATIENT)
Dept: RADIOLOGY | Facility: HOSPITAL | Age: 64
Discharge: HOME | End: 2024-07-02
Payer: COMMERCIAL

## 2024-07-02 VITALS — WEIGHT: 194 LBS | HEIGHT: 66 IN | BODY MASS INDEX: 31.18 KG/M2

## 2024-07-02 DIAGNOSIS — M25.532 LEFT WRIST PAIN: ICD-10-CM

## 2024-07-02 DIAGNOSIS — M19.049 CMC ARTHRITIS: Primary | ICD-10-CM

## 2024-07-02 DIAGNOSIS — M25.532 LEFT WRIST PAIN: Primary | ICD-10-CM

## 2024-07-02 PROCEDURE — 99213 OFFICE O/P EST LOW 20 MIN: CPT | Performed by: ORTHOPAEDIC SURGERY

## 2024-07-02 PROCEDURE — 73110 X-RAY EXAM OF WRIST: CPT | Mod: LT

## 2024-07-02 ASSESSMENT — PAIN SCALES - GENERAL: PAINLEVEL_OUTOF10: 5 - MODERATE PAIN

## 2024-07-02 ASSESSMENT — PAIN - FUNCTIONAL ASSESSMENT: PAIN_FUNCTIONAL_ASSESSMENT: 0-10

## 2024-07-02 ASSESSMENT — PAIN DESCRIPTION - DESCRIPTORS: DESCRIPTORS: ACHING;SORE

## 2024-07-02 NOTE — LETTER
July 2, 2024     Suri Negro MD  [address] [facility] [address]    Patient: Becca Horvath   YOB: 1960   Date of Visit: 7/2/2024       Dear Dr. Suri Negro MD:    Thank you for referring Becca Horvath to me for evaluation. Below are my notes for this consultation.  If you have questions, please do not hesitate to call me. I look forward to following your patient along with you.       Sincerely,     Mauricio Cardozo MD      CC: No Recipients  ______________________________________________________________________________________    [facility]  Hand and Upper Extremity Service  Initial evaluation / Consultation         Consult requested by Referring Physician: Dr. Negro     Chief Complaint: Left wrist pain        63 y.o right hand dominant  presenting for left wrist pain. He injured his left hand in mid May and was seen at [facility] where xrays revealed arthritic changes throughout the wrist and hand. He subsequently followed up with his PCP who referred him here today. He's continuing to work and reports his symptoms have improved but not completely resolved. He localizes the pain to the base of his thumb and aggravated by pinching type maneuvers.            Please refer to New Patient Intake Form scanned into patient's electronic record for self reported past medical history, past surgical history, medications, allergies, family history, social history and 10 point review of systems    Examination:  Constitutional: Oriented to person, place, and time.  Appears well-developed and well-nourished.  Head: Normocephalic and atraumatic.  Eyes: Pupils are equal, round, and reactive to light.  Cardiovascular: Intact distal pulses.  Pulmonary/Chest/Breast: Effort normal. No respiratory distress.  Neurological: Alert and oriented to person, place, and time.  Skin: Skin is warm and dry.  Psychiatric: normal mood and affect.  Behavior is  normal.  Musculoskeletal: Left hand reveals minimal degenerative changes. Full forearm range of motion without DRUJ instability. No tenderness to palpation over DRUJ. Excellent wrist range of motion and full digital range of motion. Positive thumb CMC grind test with pain. Mild tenderness to palpation over STT joint.        Personal Interpretation of Diagnostic studies: Xrays of left wrist taken today demonstrate arthritic changes present at the DRUJ. Moderately severe pantrapezial arthritic changes.        Impression:  Left thumb CMC arthritis       Plan: He has DRUJ arthritis on his xrays but this doesn't correlate with any symptoms or physical exam findings. We've discussed treatment options for his thumb pain but he believes his symptoms are improving and he's declined injections today. We gave him a Comfort Cool splint he can wear as needed at home and at work. He would like to be treated conservatively and should his symptoms persist then he can return as needed and if he has interest in a steroid injection.       Patient was prescribed a Comfort Cool for CMC arthritis. The patient has weakness, instability and/or deformity of their left thumb which requires stabilization from this orthosis to improve their function.      Verbal and written instructions for the use, wear schedule, cleaning and application of this item were given.  Patient was instructed that should the brace result in increased pain, decreased sensation, increased swelling, or an overall worsening of their medical condition, to please contact our office immediately.     Orthotic management and training was provided for skin care, modifications due to healing tissues, edema changes, interruption in skin integrity, and safety precautions with the orthosis.      Follow up: As needed              Mauricio Cardozo MD  [facility]  Department of Orthopaedic Surgery  Hand and Upper Extremity  Reconstruction      Scribe Attestation  By signing my name below, I, Anabell Collins   attest that this documentation has been prepared under the direction and in the presence of Dr. Mauricio Cardozo.      Dictation performed with the use of voice recognition software.  Syntax and grammatical errors may exist.

## 2024-07-08 DIAGNOSIS — N13.8 BPH WITH OBSTRUCTION/LOWER URINARY TRACT SYMPTOMS: ICD-10-CM

## 2024-07-08 DIAGNOSIS — N40.1 BPH WITH OBSTRUCTION/LOWER URINARY TRACT SYMPTOMS: ICD-10-CM

## 2024-07-08 DIAGNOSIS — I10 HTN (HYPERTENSION), BENIGN: ICD-10-CM

## 2024-07-08 RX ORDER — TAMSULOSIN HYDROCHLORIDE 0.4 MG/1
CAPSULE ORAL
Qty: 180 CAPSULE | Refills: 0 | Status: SHIPPED | OUTPATIENT
Start: 2024-07-08

## 2024-07-08 RX ORDER — LOSARTAN POTASSIUM 100 MG/1
100 TABLET ORAL DAILY
Qty: 90 TABLET | Refills: 0 | Status: SHIPPED | OUTPATIENT
Start: 2024-07-08 | End: 2024-07-12 | Stop reason: WASHOUT

## 2024-07-12 ENCOUNTER — TELEPHONE (OUTPATIENT)
Dept: PRIMARY CARE | Facility: CLINIC | Age: 64
End: 2024-07-12

## 2024-07-12 ENCOUNTER — APPOINTMENT (OUTPATIENT)
Dept: PRIMARY CARE | Facility: CLINIC | Age: 64
End: 2024-07-12
Payer: COMMERCIAL

## 2024-07-12 VITALS
HEART RATE: 64 BPM | BODY MASS INDEX: 31.99 KG/M2 | HEIGHT: 65 IN | SYSTOLIC BLOOD PRESSURE: 92 MMHG | DIASTOLIC BLOOD PRESSURE: 62 MMHG | TEMPERATURE: 98.4 F | RESPIRATION RATE: 16 BRPM | WEIGHT: 192 LBS

## 2024-07-12 DIAGNOSIS — I25.10 CORONARY ARTERY DISEASE INVOLVING NATIVE HEART, UNSPECIFIED VESSEL OR LESION TYPE, UNSPECIFIED WHETHER ANGINA PRESENT: ICD-10-CM

## 2024-07-12 DIAGNOSIS — I10 HTN (HYPERTENSION), BENIGN: ICD-10-CM

## 2024-07-12 DIAGNOSIS — Z00.00 ANNUAL PHYSICAL EXAM: Primary | ICD-10-CM

## 2024-07-12 PROCEDURE — 93000 ELECTROCARDIOGRAM COMPLETE: CPT | Performed by: INTERNAL MEDICINE

## 2024-07-12 PROCEDURE — 99396 PREV VISIT EST AGE 40-64: CPT | Performed by: INTERNAL MEDICINE

## 2024-07-12 PROCEDURE — 1036F TOBACCO NON-USER: CPT | Performed by: INTERNAL MEDICINE

## 2024-07-12 PROCEDURE — 3074F SYST BP LT 130 MM HG: CPT | Performed by: INTERNAL MEDICINE

## 2024-07-12 PROCEDURE — 3078F DIAST BP <80 MM HG: CPT | Performed by: INTERNAL MEDICINE

## 2024-07-12 RX ORDER — LOSARTAN POTASSIUM 100 MG/1
100 TABLET ORAL DAILY
Qty: 90 TABLET | Refills: 0 | Status: SHIPPED | OUTPATIENT
Start: 2024-07-12

## 2024-07-12 RX ORDER — CLOPIDOGREL BISULFATE 75 MG/1
75 TABLET ORAL DAILY
Qty: 90 TABLET | Refills: 3 | Status: SHIPPED | OUTPATIENT
Start: 2024-07-12

## 2024-07-12 ASSESSMENT — ENCOUNTER SYMPTOMS
CHEST TIGHTNESS: 0
COUGH: 0
SHORTNESS OF BREATH: 0
LIGHT-HEADEDNESS: 0
HEADACHES: 0
WHEEZING: 0

## 2024-07-12 NOTE — PROGRESS NOTES
"Subjective   Patient ID: Becca Horvath is a 63 y.o. male who presents for annual physical exam.  Concerned about low blood pressure.    HPI     This is a 63 years Old man with medical History of HTN, Hyperlipidemia, GERD, CAD, s/p PCI placement with 4 stents placement, anemia, bilateral knee osteoarthritis, BPH, s/p  L TKR 6/7/2023, s/p RTR 12/14/2024.  Patient is presented for annual physical exam.  Has been having low blood pressure.  Working.  Trying to exercise.     Review of Systems   Respiratory:  Negative for cough, chest tightness, shortness of breath and wheezing.    Neurological:  Negative for light-headedness and headaches.       Objective   BP 92/62   Pulse 64   Temp 36.9 °C (98.4 °F) (Temporal)   Resp 16   Ht 1.638 m (5' 4.5\")   Wt 87.1 kg (192 lb)   BMI 32.45 kg/m²     Physical Exam  Constitutional:       Appearance: Normal appearance.   HENT:      Head: Normocephalic and atraumatic.      Right Ear: External ear normal.      Left Ear: External ear normal.      Nose: Nose normal.      Mouth/Throat:      Mouth: Mucous membranes are moist.   Eyes:      Extraocular Movements: Extraocular movements intact.      Conjunctiva/sclera: Conjunctivae normal.      Pupils: Pupils are equal, round, and reactive to light.   Cardiovascular:      Rate and Rhythm: Normal rate.      Pulses: Normal pulses.      Heart sounds: Normal heart sounds.   Pulmonary:      Effort: Pulmonary effort is normal.      Breath sounds: Normal breath sounds.   Abdominal:      General: Abdomen is flat. Bowel sounds are normal.      Palpations: Abdomen is soft.   Musculoskeletal:         General: Normal range of motion.      Cervical back: Normal range of motion and neck supple.   Skin:     General: Skin is warm and dry.   Neurological:      General: No focal deficit present.      Mental Status: He is alert.   Psychiatric:         Mood and Affect: Mood normal.         Behavior: Behavior normal.         Thought Content: Thought content " normal.         Judgment: Judgment normal.         Assessment/Plan   Problem List Items Addressed This Visit             ICD-10-CM    HTN (hypertension), benign I10    Relevant Medications    losartan (Cozaar) 100 mg tablet    Coronary artery disease I25.10    Relevant Medications    clopidogrel (Plavix) 75 mg tablet     Other Visit Diagnoses         Codes    Annual physical exam    -  Primary Z00.00    Relevant Orders    CBC    Comprehensive Metabolic Panel    ECG 12 Lead (Completed)    Hemoglobin A1C    Lipid Panel    POCT UA (Automated) docked device    Prostate Specific Antigen    Thyroid Stimulating Hormone    Referral to Ophthalmology             -Health maintenance.  Complete physical exam is today.  Colonoscopy.  Ophthalmology.  Influenza vaccination.  EKG, urinalysis, fasting blood work today.    Hypertension.  Patient's blood pressure is low today.  Please, clarify, if you are taking amlodipine.  Take metoprolol 12.5 mg twice a day.  Will decrease losartan dose, if you are not taking amlodipine.    S/p ED visit for L wrist pain 5/23/2024.    Much improved.     S/p R knee TKR 12/14/2023.  Has a good results.     S/p  L Knee replacement 6/7/2023.  Stable.     -Coronary artery disease.  History of stent placements 6 years ago.  Control risk factors.  Take medications as directed.         -Hyperlipidemia.  Taking rosuvastatin 40 mg at nighttime.  Keep Mediterranean diet, exercise.     -BPH.  Taking doxazosin.  PSA.     -Gastroesophageal reflux disease.  Eat small portions.  Avoid Caffeine, spicy foods, chocolate, alcohol.  Do not wear tight clothes.  Keep last meal before bed time > 3 hours.  Keep head side of the bed elevated at all time.        - Class 2 Obesity with BMI 32.45  Diet, exercise.  Keep ideal BMI less than 25.

## 2024-07-29 DIAGNOSIS — I10 HTN (HYPERTENSION), BENIGN: ICD-10-CM

## 2024-07-29 RX ORDER — METOPROLOL TARTRATE 25 MG/1
12.5 TABLET, FILM COATED ORAL NIGHTLY
Qty: 90 TABLET | Refills: 0 | Status: SHIPPED | OUTPATIENT
Start: 2024-07-29 | End: 2024-07-31 | Stop reason: SDUPTHER

## 2024-07-31 DIAGNOSIS — I10 HTN (HYPERTENSION), BENIGN: ICD-10-CM

## 2024-07-31 RX ORDER — METOPROLOL TARTRATE 25 MG/1
12.5 TABLET, FILM COATED ORAL NIGHTLY
Qty: 90 TABLET | Refills: 0 | Status: SHIPPED | OUTPATIENT
Start: 2024-07-31 | End: 2024-07-31 | Stop reason: SDUPTHER

## 2024-07-31 RX ORDER — METOPROLOL TARTRATE 25 MG/1
12.5 TABLET, FILM COATED ORAL NIGHTLY
Qty: 90 TABLET | Refills: 0 | Status: SHIPPED | OUTPATIENT
Start: 2024-07-31

## 2024-07-31 RX ORDER — METOPROLOL TARTRATE 25 MG/1
12.5 TABLET, FILM COATED ORAL NIGHTLY
Qty: 90 TABLET | Refills: 3 | Status: SHIPPED | OUTPATIENT
Start: 2024-07-31

## 2024-10-01 ENCOUNTER — TELEPHONE (OUTPATIENT)
Dept: PRIMARY CARE | Facility: CLINIC | Age: 64
End: 2024-10-01
Payer: COMMERCIAL

## 2024-10-03 ENCOUNTER — TELEPHONE (OUTPATIENT)
Dept: PRIMARY CARE | Facility: CLINIC | Age: 64
End: 2024-10-03
Payer: COMMERCIAL

## 2024-10-04 DIAGNOSIS — R91.8 PULMONARY NODULES: Primary | ICD-10-CM

## 2024-10-07 DIAGNOSIS — I10 HTN (HYPERTENSION), BENIGN: ICD-10-CM

## 2024-10-07 RX ORDER — LOSARTAN POTASSIUM 100 MG/1
100 TABLET ORAL DAILY
Qty: 90 TABLET | Refills: 0 | Status: SHIPPED | OUTPATIENT
Start: 2024-10-07

## 2024-10-08 ENCOUNTER — HOSPITAL ENCOUNTER (OUTPATIENT)
Dept: RADIOLOGY | Facility: CLINIC | Age: 64
Discharge: HOME | End: 2024-10-08
Payer: COMMERCIAL

## 2024-10-08 DIAGNOSIS — R91.8 MULTIPLE LUNG NODULES ON CT: ICD-10-CM

## 2024-10-08 DIAGNOSIS — R91.8 MULTIPLE LUNG NODULES ON CT: Primary | ICD-10-CM

## 2024-10-08 DIAGNOSIS — R91.8 PULMONARY NODULES: ICD-10-CM

## 2024-10-08 PROCEDURE — 71250 CT THORAX DX C-: CPT

## 2024-10-08 PROCEDURE — 71250 CT THORAX DX C-: CPT | Performed by: RADIOLOGY

## 2024-10-16 ENCOUNTER — APPOINTMENT (OUTPATIENT)
Dept: PRIMARY CARE | Facility: CLINIC | Age: 64
End: 2024-10-16
Payer: COMMERCIAL

## 2024-10-16 ENCOUNTER — HOSPITAL ENCOUNTER (OUTPATIENT)
Dept: RADIOLOGY | Facility: CLINIC | Age: 64
Discharge: HOME | End: 2024-10-16
Payer: COMMERCIAL

## 2024-10-16 ENCOUNTER — TELEPHONE (OUTPATIENT)
Dept: PRIMARY CARE | Facility: CLINIC | Age: 64
End: 2024-10-16

## 2024-10-16 VITALS
SYSTOLIC BLOOD PRESSURE: 122 MMHG | HEIGHT: 66 IN | DIASTOLIC BLOOD PRESSURE: 72 MMHG | WEIGHT: 200 LBS | TEMPERATURE: 98.1 F | BODY MASS INDEX: 32.14 KG/M2 | RESPIRATION RATE: 16 BRPM | HEART RATE: 64 BPM

## 2024-10-16 DIAGNOSIS — R91.8 LUNG NODULES: Primary | ICD-10-CM

## 2024-10-16 DIAGNOSIS — R53.81 MALAISE AND FATIGUE: ICD-10-CM

## 2024-10-16 DIAGNOSIS — M79.641 PAIN OF RIGHT HAND: Primary | ICD-10-CM

## 2024-10-16 DIAGNOSIS — D50.9 IRON DEFICIENCY ANEMIA, UNSPECIFIED IRON DEFICIENCY ANEMIA TYPE: ICD-10-CM

## 2024-10-16 DIAGNOSIS — R53.83 MALAISE AND FATIGUE: ICD-10-CM

## 2024-10-16 DIAGNOSIS — M25.50 ARTHRALGIA, UNSPECIFIED JOINT: ICD-10-CM

## 2024-10-16 DIAGNOSIS — R73.9 HYPERGLYCEMIA: ICD-10-CM

## 2024-10-16 DIAGNOSIS — M79.641 PAIN OF RIGHT HAND: ICD-10-CM

## 2024-10-16 DIAGNOSIS — E78.5 HYPERLIPIDEMIA, UNSPECIFIED HYPERLIPIDEMIA TYPE: ICD-10-CM

## 2024-10-16 LAB
ALBUMIN SERPL BCP-MCNC: 3.6 G/DL (ref 3.4–5)
ALP SERPL-CCNC: 56 U/L (ref 45–117)
ALT SERPL W P-5'-P-CCNC: 22 U/L (ref 14–59)
ANION GAP SERPL CALC-SCNC: 15 MMOL/L (ref 10–20)
AST SERPL W P-5'-P-CCNC: 15 U/L (ref 15–37)
BASOPHILS # BLD AUTO: 0.02 X10*3/UL (ref 0.1–1.6)
BASOPHILS NFR BLD AUTO: 0.37 % (ref 0–0.3)
BILIRUB SERPL-MCNC: 0.2 MG/DL (ref 0.2–1)
BUN SERPL-MCNC: 22 MG/DL (ref 7–18)
CALCIUM SERPL-MCNC: 8.9 MG/DL (ref 8.5–10.1)
CHLORIDE SERPL-SCNC: 102 MMOL/L (ref 98–107)
CHOLEST SERPL-MCNC: 116 MG/DL (ref 0–199)
CHOLESTEROL/HDL RATIO: 1.9 (ref 4.2–7)
CO2 SERPL-SCNC: 26 MMOL/L (ref 21–32)
CREAT SERPL-MCNC: 0.94 MG/DL (ref 0.6–1.1)
EGFRCR SERPLBLD CKD-EPI 2021: >90 ML/MIN/1.73M*2
EOSINOPHIL # BLD AUTO: 0.25 X10*3/UL (ref 0.04–0.5)
EOSINOPHIL NFR BLD AUTO: 6.1 % (ref 0.7–7)
ERYTHROCYTE [DISTWIDTH] IN BLOOD BY AUTOMATED COUNT: 14.4 % (ref 11.5–14.5)
GLUCOSE SERPL-MCNC: 112 MG/DL (ref 74–100)
HBA1C MFR BLD: 5.3 %
HCT VFR BLD AUTO: 38.1 % (ref 38.4–51.3)
HDLC SERPL-MCNC: 62 MG/DL (ref 40–59)
HGB BLD-MCNC: 12.61 G/DL (ref 12.7–17)
IS PATIENT FASTING: YES
LDLC SERPL DIRECT ASSAY-MCNC: 52 MG/DL (ref 0–100)
LYMPHOCYTES # BLD AUTO: 1.07 X10*3/UL (ref 0–6)
LYMPHOCYTES NFR BLD AUTO: 26.15 % (ref 20.5–51.1)
MCH RBC QN AUTO: 30.9 PG (ref 26–32)
MCHC RBC AUTO-ENTMCNC: 33.1 G/DL (ref 31–38)
MCV RBC AUTO: 93.4 FL (ref 80–96)
MONOCYTES # BLD AUTO: 0.49 X10*3/UL (ref 1.6–24.9)
MONOCYTES NFR BLD AUTO: 12.02 % (ref 1.7–9.3)
NEUTROPHILS # BLD AUTO: 2.26 X10*3/UL (ref 1.4–6.5)
NEUTROPHILS NFR BLD AUTO: 55.36 % (ref 42.2–75.2)
PLATELET # BLD AUTO: 214.1 X10*3/UL (ref 150–450)
PMV BLD AUTO: 8.94 FL (ref 7.8–11)
POTASSIUM SERPL-SCNC: 4.8 MMOL/L (ref 3.5–5.1)
PROT SERPL-MCNC: 7.3 G/DL (ref 6.4–8.2)
RBC # BLD AUTO: 4.08 X10*6/UL (ref 4.1–5.6)
SODIUM SERPL-SCNC: 138 MMOL/L (ref 136–145)
TRIGL SERPL-MCNC: 41 MG/DL
TSH SERPL-ACNC: 1.18 MIU/L (ref 0.44–3.98)
WBC # BLD AUTO: 4.08 X10*3/UL (ref 4.5–10.5)

## 2024-10-16 PROCEDURE — 86038 ANTINUCLEAR ANTIBODIES: CPT

## 2024-10-16 PROCEDURE — 73130 X-RAY EXAM OF HAND: CPT | Mod: RT

## 2024-10-16 PROCEDURE — 86225 DNA ANTIBODY NATIVE: CPT

## 2024-10-16 PROCEDURE — 86200 CCP ANTIBODY: CPT

## 2024-10-16 PROCEDURE — 85652 RBC SED RATE AUTOMATED: CPT

## 2024-10-16 PROCEDURE — 86431 RHEUMATOID FACTOR QUANT: CPT

## 2024-10-16 PROCEDURE — 84550 ASSAY OF BLOOD/URIC ACID: CPT

## 2024-10-16 PROCEDURE — 86140 C-REACTIVE PROTEIN: CPT

## 2024-10-16 PROCEDURE — 86235 NUCLEAR ANTIGEN ANTIBODY: CPT

## 2024-10-16 RX ORDER — METHYLPREDNISOLONE 4 MG/1
TABLET ORAL
Qty: 21 TABLET | Refills: 0 | Status: SHIPPED | OUTPATIENT
Start: 2024-10-16 | End: 2024-10-23

## 2024-10-16 RX ORDER — METHYLPREDNISOLONE 4 MG/1
TABLET ORAL
Qty: 21 TABLET | Refills: 0 | Status: SHIPPED | OUTPATIENT
Start: 2024-10-16 | End: 2024-10-16 | Stop reason: SDUPTHER

## 2024-10-16 ASSESSMENT — ENCOUNTER SYMPTOMS
APPETITE CHANGE: 0
ARTHRALGIAS: 1
ACTIVITY CHANGE: 0
COLOR CHANGE: 1

## 2024-10-16 ASSESSMENT — PAIN SCALES - GENERAL: PAINLEVEL_OUTOF10: 7

## 2024-10-16 NOTE — PROGRESS NOTES
"Subjective   Patient ID: Becca Horvath is a 64 y.o. male who presents with chief complaint of R hand edema, pain.  Started 2 days ago.  In need for paper work for Pulmonary nodules follow up.    HPI     This is a 64 years Old man with medical History of HTN, Hyperlipidemia, GERD, CAD, s/p PCI placement with 4 stents placement, anemia, bilateral knee osteoarthritis, BPH, s/p  L TKR 6/7/2023, s/p RTR 12/14/2024.  Patient is presented for evaluation and treatment of the above complaints.  Stated, that developed right hand pain  Has no fever or chills.  Denies to have any trauma.  Trying to exercise.     Review of Systems   Constitutional:  Negative for activity change and appetite change.   Musculoskeletal:  Positive for arthralgias.   Skin:  Positive for color change.       Objective   /72   Pulse 64   Temp 36.7 °C (98.1 °F) (Temporal)   Resp 16   Ht 1.676 m (5' 6\")   Wt 90.7 kg (200 lb)   BMI 32.28 kg/m²     Physical Exam  HENT:      Head: Normocephalic.   Eyes:      Pupils: Pupils are equal, round, and reactive to light.   Cardiovascular:      Heart sounds: Normal heart sounds.   Pulmonary:      Breath sounds: Normal breath sounds.   Abdominal:      Palpations: Abdomen is soft.   Musculoskeletal:         General: Swelling and tenderness present.   Skin:     General: Skin is warm.   Neurological:      Mental Status: Mental status is at baseline.   Psychiatric:         Mood and Affect: Mood normal.         Assessment/Plan   Problem List Items Addressed This Visit             ICD-10-CM    Anemia D64.9    Hyperlipidemia E78.5    Relevant Orders    Comprehensive Metabolic Panel (Completed)    Lipid Panel (Completed)     Other Visit Diagnoses         Codes    Pain of right hand    -  Primary M79.641    Relevant Medications    methylPREDNISolone (Medrol Dospak) 4 mg tablets    Other Relevant Orders    XR hand right 3+ views (Completed)    Malaise and fatigue     R53.81, R53.83    Relevant Orders    CBC w/5 Part " Differential, [facility] (Completed)    Thyroid Stimulating Hormone (Completed)    Hyperglycemia     R73.9    Relevant Orders    Hemoglobin A1C (Completed)    Arthralgia, unspecified joint     M25.50    Relevant Orders    Arthritis Panel (CMS)    Citrulline Antibody, IgG    C-Reactive Protein          Right hand swelling, pain.  Change in color.  Will obtain x-ray.  Start on Medrol pack.  Take Tylenol as needed.     -Health maintenance.  Complete physical exam is  up to date.  Colonoscopy.  Ophthalmology.  Influenza vaccination.     Hypertension.  Patient's blood pressure is  well controlled.  Take metoprolol 12.5 mg twice a day.      S/p ED visit for L wrist pain 5/23/2024.    Much improved.     S/p R knee TKR 12/14/2023.  Has a good results.     S/p  L Knee replacement 6/7/2023.  Stable.     -Coronary artery disease.  History of stent placements 6 years ago.  Control risk factors.  Take medications as directed.         -Hyperlipidemia.  Taking rosuvastatin 40 mg at nighttime.  Keep Mediterranean diet, exercise.     -BPH.  Taking doxazosin.  PSA.     -Gastroesophageal reflux disease.  Eat small portions.  Avoid Caffeine, spicy foods, chocolate, alcohol.  Do not wear tight clothes.  Keep last meal before bed time > 3 hours.  Keep head side of the bed elevated at all time.     - Class 2 Obesity with BMI 32.28  Diet, exercise.  Keep ideal BMI less than 25.

## 2024-10-16 NOTE — TELEPHONE ENCOUNTER
Spoke with Becca re: CT Chest   Multiple lung nodules measuring up to 6 mm.   Recommend CT Chest 1 year  Order placed.

## 2024-10-17 LAB
ANA PATTERN: ABNORMAL
ANA SER QL HEP2 SUBST: POSITIVE
ANA TITR SER IF: ABNORMAL {TITER}
CCP IGG SERPL-ACNC: 1 U/ML
CENTROMERE B AB SER-ACNC: <0.2 AI
CHROMATIN AB SERPL-ACNC: <0.2 AI
CRP SERPL-MCNC: 1.24 MG/DL
DSDNA AB SER-ACNC: 4 IU/ML
ENA JO1 AB SER QL IA: <0.2 AI
ENA RNP AB SER IA-ACNC: <0.2 AI
ENA SCL70 AB SER QL IA: <0.2 AI
ENA SM AB SER IA-ACNC: <0.2 AI
ENA SM+RNP AB SER QL IA: <0.2 AI
ENA SS-A AB SER IA-ACNC: 0.2 AI
ENA SS-B AB SER IA-ACNC: <0.2 AI
ERYTHROCYTE [SEDIMENTATION RATE] IN BLOOD BY WESTERGREN METHOD: 14 MM/H (ref 0–20)
RHEUMATOID FACT SER NEPH-ACNC: <10 IU/ML (ref 0–15)
RIBOSOMAL P AB SER-ACNC: <0.2 AI
URATE SERPL-MCNC: 5.4 MG/DL (ref 4–7.5)

## 2024-10-18 ENCOUNTER — APPOINTMENT (OUTPATIENT)
Dept: RADIOLOGY | Facility: CLINIC | Age: 64
End: 2024-10-18
Payer: COMMERCIAL

## 2024-10-18 ENCOUNTER — APPOINTMENT (OUTPATIENT)
Dept: RADIOLOGY | Facility: HOSPITAL | Age: 64
End: 2024-10-18
Payer: COMMERCIAL

## 2024-10-18 DIAGNOSIS — M79.641 PAIN OF RIGHT HAND: Primary | ICD-10-CM

## 2024-10-18 DIAGNOSIS — M79.5 FOREIGN BODY (FB) IN SOFT TISSUE: ICD-10-CM

## 2024-10-24 ENCOUNTER — APPOINTMENT (OUTPATIENT)
Dept: PRIMARY CARE | Facility: CLINIC | Age: 64
End: 2024-10-24
Payer: COMMERCIAL

## 2024-11-06 ENCOUNTER — HOSPITAL ENCOUNTER (OUTPATIENT)
Dept: RADIOLOGY | Facility: HOSPITAL | Age: 64
Discharge: HOME | End: 2024-11-06
Payer: COMMERCIAL

## 2024-11-06 DIAGNOSIS — M79.602 PAIN OF LEFT UPPER EXTREMITY: Primary | ICD-10-CM

## 2024-11-06 DIAGNOSIS — M79.5 FOREIGN BODY (FB) IN SOFT TISSUE: ICD-10-CM

## 2024-11-06 DIAGNOSIS — M79.641 PAIN OF RIGHT HAND: ICD-10-CM

## 2024-11-11 ENCOUNTER — HOSPITAL ENCOUNTER (OUTPATIENT)
Dept: RADIOLOGY | Facility: CLINIC | Age: 64
Discharge: HOME | End: 2024-11-11
Payer: COMMERCIAL

## 2024-11-11 DIAGNOSIS — M79.602 PAIN OF LEFT UPPER EXTREMITY: ICD-10-CM

## 2024-11-11 PROCEDURE — 73060 X-RAY EXAM OF HUMERUS: CPT | Mod: LEFT SIDE | Performed by: RADIOLOGY

## 2024-11-11 PROCEDURE — 73060 X-RAY EXAM OF HUMERUS: CPT | Mod: LT

## 2024-11-15 ENCOUNTER — HOSPITAL ENCOUNTER (OUTPATIENT)
Dept: RADIOLOGY | Facility: CLINIC | Age: 64
End: 2024-11-15
Payer: COMMERCIAL

## 2024-12-30 DIAGNOSIS — N13.8 BPH WITH OBSTRUCTION/LOWER URINARY TRACT SYMPTOMS: ICD-10-CM

## 2024-12-30 DIAGNOSIS — K21.9 GASTROESOPHAGEAL REFLUX DISEASE, UNSPECIFIED WHETHER ESOPHAGITIS PRESENT: ICD-10-CM

## 2024-12-30 DIAGNOSIS — N40.1 BPH WITH OBSTRUCTION/LOWER URINARY TRACT SYMPTOMS: ICD-10-CM

## 2024-12-30 DIAGNOSIS — I10 HTN (HYPERTENSION), BENIGN: ICD-10-CM

## 2024-12-30 RX ORDER — PANTOPRAZOLE SODIUM 40 MG/1
40 TABLET, DELAYED RELEASE ORAL
Qty: 90 TABLET | Refills: 0 | Status: SHIPPED | OUTPATIENT
Start: 2024-12-30

## 2024-12-30 RX ORDER — TAMSULOSIN HYDROCHLORIDE 0.4 MG/1
CAPSULE ORAL
Qty: 180 CAPSULE | Refills: 0 | Status: SHIPPED | OUTPATIENT
Start: 2024-12-30

## 2025-01-02 RX ORDER — LOSARTAN POTASSIUM 100 MG/1
100 TABLET ORAL DAILY
Qty: 90 TABLET | Refills: 3 | Status: SHIPPED | OUTPATIENT
Start: 2025-01-02

## 2025-01-15 ENCOUNTER — TELEMEDICINE (OUTPATIENT)
Dept: PRIMARY CARE | Facility: CLINIC | Age: 65
End: 2025-01-15
Payer: COMMERCIAL

## 2025-01-15 DIAGNOSIS — R05.9 COUGH, UNSPECIFIED TYPE: Primary | ICD-10-CM

## 2025-01-15 PROCEDURE — 99212 OFFICE O/P EST SF 10 MIN: CPT | Performed by: INTERNAL MEDICINE

## 2025-01-15 RX ORDER — AZITHROMYCIN 250 MG/1
TABLET, FILM COATED ORAL
Qty: 6 TABLET | Refills: 0 | Status: SHIPPED | OUTPATIENT
Start: 2025-01-15 | End: 2025-01-20

## 2025-01-15 ASSESSMENT — ENCOUNTER SYMPTOMS
FEVER: 0
COUGH: 1
HEARTBURN: 0
WHEEZING: 0
SORE THROAT: 1
CHILLS: 0
MYALGIAS: 0
RHINORRHEA: 0
SHORTNESS OF BREATH: 0
HEADACHES: 1
WEIGHT LOSS: 0
HEMOPTYSIS: 0
SWEATS: 0

## 2025-01-15 NOTE — PROGRESS NOTES
Subjective   Patient ID: Becca Horvath is a 64 y.o. male who is evaluated via telemedicine.  Virtual or Telephone Consent    An interactive audio and video telecommunication system which permits real time communications between the patient (at the originating site) and provider (at the distant site) was utilized to provide this telehealth service.   Verbal consent was requested and obtained from Becca Horvath on this date, 01/15/25 for a telehealth visit.    Chief Complaint is cough, fatigue and malaise.    Cough  This is a new problem. The current episode started in the past 7 days. The problem occurs hourly. The cough is Non-productive. Associated symptoms include headaches, nasal congestion and a sore throat. Pertinent negatives include no chest pain, chills, ear congestion, ear pain, fever, heartburn, hemoptysis, myalgias, postnasal drip, rash, rhinorrhea, shortness of breath, sweats, weight loss or wheezing. Nothing aggravates the symptoms.      This is a 64 years Old man with medical History of HTN, Hyperlipidemia, GERD, CAD, s/p PCI placement with 4 stents placement, anemia, bilateral knee osteoarthritis, BPH, s/p  L TKR 6/7/2023, s/p RTR 12/14/2024.  Patient is evaluated via telemedicine.  Became sick 5 days ago.  Initially, had cough, sinusitis, sore throat.  Evaluated by telemedicine on Saturday, advised to take Mucinex.  Stated, that not feeling better.  Still has cough, sore throat.  No fever or chills.  Weak.    Review of Systems   Constitutional:  Negative for chills, fever and weight loss.   HENT:  Positive for sore throat. Negative for ear pain, postnasal drip and rhinorrhea.    Respiratory:  Positive for cough. Negative for hemoptysis, shortness of breath and wheezing.    Cardiovascular:  Negative for chest pain.   Gastrointestinal:  Negative for heartburn.   Musculoskeletal:  Negative for myalgias.   Skin:  Negative for rash.   Neurological:  Positive for headaches.       Objective   There were no  vitals taken for this visit.    Physical Exam    Assessment/Plan   Problem List Items Addressed This Visit    None  Visit Diagnoses         Codes    Cough, unspecified type    -  Primary R05.9    Relevant Medications    azithromycin (Zithromax) 250 mg tablet          Upper respiratory  tract infection.  Patient has been sick for 5 days.  Still has ongoing cough.  Has been having sore throat.  Tylenol, plenty of fluids.  Z-Vickey.     -Health maintenance.  Complete physical exam is  up to date.  Colonoscopy.  Ophthalmology.  Influenza vaccination.     Hypertension.  Patient's blood pressure is  well controlled.  Take metoprolol 12.5 mg twice a day.      S/p ED visit for L wrist pain 5/23/2024.    Much improved.     S/p R knee TKR 12/14/2023.  Has a good results.     S/p  L Knee replacement 6/7/2023.  Stable.     -Coronary artery disease.  History of stent placements 6 years ago.  Control risk factors.  Take medications as directed.         -Hyperlipidemia.  Taking rosuvastatin 40 mg at nighttime.  Keep Mediterranean diet, exercise.     -BPH.  Taking doxazosin.  PSA.     -Gastroesophageal reflux disease.  Eat small portions.  Avoid Caffeine, spicy foods, chocolate, alcohol.  Do not wear tight clothes.  Keep last meal before bed time > 3 hours.  Keep head side of the bed elevated at all time.     - Class 2 Obesity with BMI 32.28  Diet, exercise.  Keep ideal BMI less than 25.

## 2025-02-04 DIAGNOSIS — I10 HTN (HYPERTENSION), BENIGN: ICD-10-CM

## 2025-02-04 RX ORDER — METOPROLOL TARTRATE 25 MG/1
12.5 TABLET, FILM COATED ORAL NIGHTLY
Qty: 30 TABLET | Refills: 0 | Status: SHIPPED | OUTPATIENT
Start: 2025-02-04

## 2025-02-06 ENCOUNTER — APPOINTMENT (OUTPATIENT)
Dept: OPHTHALMOLOGY | Facility: CLINIC | Age: 65
End: 2025-02-06
Payer: COMMERCIAL

## 2025-02-06 DIAGNOSIS — I10 HTN (HYPERTENSION), BENIGN: Primary | ICD-10-CM

## 2025-02-06 DIAGNOSIS — Z00.00 ANNUAL PHYSICAL EXAM: ICD-10-CM

## 2025-02-06 DIAGNOSIS — H52.4 PRESBYOPIA: ICD-10-CM

## 2025-02-06 PROCEDURE — 92004 COMPRE OPH EXAM NEW PT 1/>: CPT | Performed by: OPHTHALMOLOGY

## 2025-02-06 ASSESSMENT — VISUAL ACUITY
METHOD: SNELLEN - LINEAR
OS_SC+: -1
OD_BAT_MED: 20/30
OS_SC: 20/25
OD_SC: 20/20
OS_BAT_MED: 20/30

## 2025-02-06 ASSESSMENT — REFRACTION_MANIFEST
OS_CYLINDER: -1.00
OD_SPHERE: PLANO
OS_AXIS: 077
METHOD_AUTOREFRACTION: 1
OS_ADD: +2.50
OD_SPHERE: -0.25
OD_CYLINDER: -0.75
OS_SPHERE: PLANO
OS_CYLINDER: -0.50
OS_AXIS: 092
OD_AXIS: 106
OD_ADD: +2.50
OS_SPHERE: +0.50
OD_AXIS: 093
OD_CYLINDER: -0.75

## 2025-02-06 ASSESSMENT — CONF VISUAL FIELD
OD_INFERIOR_NASAL_RESTRICTION: 0
METHOD: COUNTING FINGERS
OS_SUPERIOR_NASAL_RESTRICTION: 0
OD_NORMAL: 1
OS_INFERIOR_TEMPORAL_RESTRICTION: 0
OS_NORMAL: 1
OD_SUPERIOR_TEMPORAL_RESTRICTION: 0
OD_INFERIOR_TEMPORAL_RESTRICTION: 0
OS_INFERIOR_NASAL_RESTRICTION: 0
OD_SUPERIOR_NASAL_RESTRICTION: 0
OS_SUPERIOR_TEMPORAL_RESTRICTION: 0

## 2025-02-06 ASSESSMENT — SLIT LAMP EXAM - LIDS
COMMENTS: NORMAL
COMMENTS: NORMAL

## 2025-02-06 ASSESSMENT — TONOMETRY
IOP_METHOD: GOLDMANN APPLANATION
OS_IOP_MMHG: 13
OD_IOP_MMHG: 12

## 2025-02-06 ASSESSMENT — EXTERNAL EXAM - LEFT EYE: OS_EXAM: NORMAL

## 2025-02-06 ASSESSMENT — CUP TO DISC RATIO
OD_RATIO: 0.2
OS_RATIO: 0.2

## 2025-02-06 ASSESSMENT — EXTERNAL EXAM - RIGHT EYE: OD_EXAM: NORMAL

## 2025-02-06 NOTE — PROGRESS NOTES
Assessment/Plan   Diagnoses and all orders for this visit:  HTN (hypertension), benign  Annual physical exam  -     Referral to Ophthalmology  Presbyopia  Patient prefers over the counter reading glasses   I discussed the results of the exam and testing done today with the patient.Expressed understanding and all questions answered   -Followup in 2 years or as needed for symptoms (RSVP: redness, sensitivity to light, vision loss, pain)

## 2025-03-24 DIAGNOSIS — K21.9 GASTROESOPHAGEAL REFLUX DISEASE, UNSPECIFIED WHETHER ESOPHAGITIS PRESENT: ICD-10-CM

## 2025-03-24 DIAGNOSIS — I10 HTN (HYPERTENSION), BENIGN: ICD-10-CM

## 2025-03-24 RX ORDER — PANTOPRAZOLE SODIUM 40 MG/1
40 TABLET, DELAYED RELEASE ORAL
Qty: 90 TABLET | Refills: 0 | Status: SHIPPED | OUTPATIENT
Start: 2025-03-24

## 2025-03-24 RX ORDER — METOPROLOL TARTRATE 25 MG/1
12.5 TABLET, FILM COATED ORAL NIGHTLY
Qty: 90 TABLET | Refills: 3 | Status: SHIPPED | OUTPATIENT
Start: 2025-03-24

## 2025-03-31 ENCOUNTER — APPOINTMENT (OUTPATIENT)
Dept: PRIMARY CARE | Facility: CLINIC | Age: 65
End: 2025-03-31
Payer: COMMERCIAL

## 2025-03-31 VITALS
RESPIRATION RATE: 16 BRPM | SYSTOLIC BLOOD PRESSURE: 102 MMHG | DIASTOLIC BLOOD PRESSURE: 72 MMHG | HEART RATE: 62 BPM | WEIGHT: 200 LBS | TEMPERATURE: 98.2 F | BODY MASS INDEX: 32.28 KG/M2

## 2025-03-31 DIAGNOSIS — E78.5 HYPERLIPIDEMIA, UNSPECIFIED HYPERLIPIDEMIA TYPE: ICD-10-CM

## 2025-03-31 DIAGNOSIS — R73.9 HYPERGLYCEMIA: ICD-10-CM

## 2025-03-31 DIAGNOSIS — R53.83 MALAISE AND FATIGUE: ICD-10-CM

## 2025-03-31 DIAGNOSIS — E55.9 VITAMIN D DEFICIENCY: ICD-10-CM

## 2025-03-31 DIAGNOSIS — M54.2 NECK PAIN: Primary | ICD-10-CM

## 2025-03-31 DIAGNOSIS — R53.81 MALAISE AND FATIGUE: ICD-10-CM

## 2025-03-31 DIAGNOSIS — E53.8 VITAMIN B 12 DEFICIENCY: ICD-10-CM

## 2025-03-31 PROCEDURE — 3078F DIAST BP <80 MM HG: CPT | Performed by: INTERNAL MEDICINE

## 2025-03-31 PROCEDURE — 3074F SYST BP LT 130 MM HG: CPT | Performed by: INTERNAL MEDICINE

## 2025-03-31 PROCEDURE — 1036F TOBACCO NON-USER: CPT | Performed by: INTERNAL MEDICINE

## 2025-03-31 PROCEDURE — 99213 OFFICE O/P EST LOW 20 MIN: CPT | Performed by: INTERNAL MEDICINE

## 2025-03-31 RX ORDER — LIDOCAINE 4 G/100G
PATCH TOPICAL
COMMUNITY
Start: 2025-03-25

## 2025-03-31 ASSESSMENT — ENCOUNTER SYMPTOMS
PALPITATIONS: 0
FATIGUE: 1
NECK PAIN: 1
CONSTIPATION: 0
BACK PAIN: 1
SLEEP DISTURBANCE: 0
ABDOMINAL DISTENTION: 0
UNEXPECTED WEIGHT CHANGE: 0
ACTIVITY CHANGE: 1
NERVOUS/ANXIOUS: 0

## 2025-03-31 ASSESSMENT — PAIN SCALES - GENERAL: PAINLEVEL_OUTOF10: 0-NO PAIN

## 2025-03-31 NOTE — LETTER
March 31, 2025     Patient: Becca Horvath   YOB: 1960   Date of Visit: 3/31/2025       To Whom It May Concern:    Becca Horvath was seen in my clinic on 3/31/2025 at 9:45 am.   Please excuse Becca for his absence from work  from 3/25/2025 to 3/29/2025.  Patient can return to work without restrictions 3/31/2025.    If you have any questions or concerns, please don't hesitate to call.         Sincerely,         Suri Negro MD

## 2025-03-31 NOTE — PROGRESS NOTES
Subjective   Patient ID: Becca Horvath is a 64 y.o. male who presents for  follow up after ED visit 3/25/2025 , 3/27/2025.  Has residual  neck discomfort, pain.  Patient presented for follow-up.  In need for letter for return to work.    HPI     This is a 64 years Old man with medical History of HTN, Hyperlipidemia, GERD, CAD, s/p PCI placement with 4 stents placement, anemia, bilateral knee osteoarthritis, BPH, s/p  L TKR 6/7/2023, s/p RTR 12/14/2024, s/p ED visit 3/25/2025 and 3/27/2025 for upper back and neck pain.  Patient is presented for follow-up after emergency room visit 3/25/2025 and 3/27/2025.  Patient was seen by the emergency department for episodes of neck pain, upper back pain.  Had some blood work done, x-rays done.  His pain initially got better, but then developed again, patient was seen in the emergency department 3/27/2025.  Treated with Medrol pack, oxycodone and Lidoderm patches.  Improved, but still has residual discomfort.  Has incoming appointment with cardiology in AM.    Review of Systems   Constitutional:  Positive for activity change and fatigue. Negative for unexpected weight change.   Cardiovascular:  Negative for chest pain, palpitations and leg swelling.   Gastrointestinal:  Negative for abdominal distention and constipation.   Musculoskeletal:  Positive for back pain and neck pain.   Psychiatric/Behavioral:  Negative for sleep disturbance. The patient is not nervous/anxious.        Objective   /72   Pulse 62   Temp 36.8 °C (98.2 °F) (Temporal)   Resp 16   Wt 90.7 kg (200 lb)   BMI 32.28 kg/m²     Physical Exam  Constitutional:       Appearance: Normal appearance.   HENT:      Head: Normocephalic.      Nose: Nose normal.   Eyes:      Pupils: Pupils are equal, round, and reactive to light.   Cardiovascular:      Rate and Rhythm: Normal rate and regular rhythm.      Pulses: Normal pulses.      Heart sounds: Normal heart sounds.   Pulmonary:      Breath sounds: Normal breath  sounds.   Abdominal:      Palpations: Abdomen is soft.   Musculoskeletal:         General: Tenderness present.      Cervical back: Neck supple. Tenderness present.   Skin:     General: Skin is warm and dry.   Neurological:      Mental Status: He is alert. Mental status is at baseline.   Psychiatric:         Mood and Affect: Mood normal.         Assessment/Plan   Problem List Items Addressed This Visit             ICD-10-CM    Hyperlipidemia E78.5    Relevant Orders    Lipid Panel     Other Visit Diagnoses         Codes    Neck pain    -  Primary M54.2    Relevant Orders    Referral to Physical Therapy    Hyperglycemia     R73.9    Relevant Orders    Hemoglobin A1C    Malaise and fatigue     R53.81, R53.83    Relevant Orders    Thyroid Stimulating Hormone    Vitamin D deficiency     E55.9    Relevant Orders    Vitamin D 25-Hydroxy,Total (for eval of Vitamin D levels)    Vitamin B 12 deficiency     E53.8    Relevant Orders    Vitamin B12                S/p ED visit 3/25/2025 and 3/27/2025 for neck pain, upper back pain.  Treated with Medrol pack, improving.  Taking Lidodrem.  Off work 3/25/2025 to 3/29/2025.  Return today.    Hypertension.  Patient's blood pressure is  well controlled.  Take metoprolol 12.5 mg twice a day.    -Coronary artery disease.  History of stent placements 6 years ago.  Control risk factors.  Take medications as directed.  Has cardiology appointment in am.         -Hyperlipidemia.  Taking rosuvastatin 40 mg at nighttime.  Keep Mediterranean diet, exercise.     -Health maintenance.  Complete physical exam is  up to date.  Colonoscopy.  Ophthalmology.  Influenza vaccination is up to date.          S/p R knee TKR 12/14/2023.  Has a good results.     S/p  L Knee replacement 6/7/2023.  Stable.     -BPH.  Taking doxazosin.  PSA.     -Gastroesophageal reflux disease.  Eat small portions.  Avoid Caffeine, spicy foods, chocolate, alcohol.  Do not wear tight clothes.  Keep last meal before bed time > 3  hours.  Keep head side of the bed elevated at all time.     - Class 2 Obesity with BMI 32.28  Diet, exercise.  Keep ideal BMI less than 25.

## 2025-04-02 LAB
25(OH)D3+25(OH)D2 SERPL-MCNC: 44 NG/ML (ref 30–100)
CHOLEST SERPL-MCNC: 140 MG/DL
CHOLEST/HDLC SERPL: 2.2 (CALC)
EST. AVERAGE GLUCOSE BLD GHB EST-MCNC: 117 MG/DL
EST. AVERAGE GLUCOSE BLD GHB EST-SCNC: 6.5 MMOL/L
HBA1C MFR BLD: 5.7 % OF TOTAL HGB
HDLC SERPL-MCNC: 63 MG/DL
LDLC SERPL CALC-MCNC: 62 MG/DL (CALC)
NONHDLC SERPL-MCNC: 77 MG/DL (CALC)
TRIGL SERPL-MCNC: 74 MG/DL
TSH SERPL-ACNC: 1.33 MIU/L (ref 0.4–4.5)
VIT B12 SERPL-MCNC: 294 PG/ML (ref 200–1100)

## 2025-04-08 DIAGNOSIS — N13.8 BPH WITH OBSTRUCTION/LOWER URINARY TRACT SYMPTOMS: ICD-10-CM

## 2025-04-08 DIAGNOSIS — N40.1 BPH WITH OBSTRUCTION/LOWER URINARY TRACT SYMPTOMS: ICD-10-CM

## 2025-04-08 RX ORDER — TAMSULOSIN HYDROCHLORIDE 0.4 MG/1
CAPSULE ORAL
Qty: 180 CAPSULE | Refills: 3 | Status: SHIPPED | OUTPATIENT
Start: 2025-04-08

## 2025-04-11 ENCOUNTER — EVALUATION (OUTPATIENT)
Dept: PHYSICAL THERAPY | Facility: CLINIC | Age: 65
End: 2025-04-11
Payer: COMMERCIAL

## 2025-04-11 DIAGNOSIS — M54.2 NECK PAIN: ICD-10-CM

## 2025-04-11 PROCEDURE — 97162 PT EVAL MOD COMPLEX 30 MIN: CPT | Mod: GP

## 2025-04-11 PROCEDURE — 97110 THERAPEUTIC EXERCISES: CPT | Mod: GP

## 2025-04-11 SDOH — ECONOMIC STABILITY: GENERAL: QUALITY OF LIFE: FAIR

## 2025-04-11 ASSESSMENT — ENCOUNTER SYMPTOMS
EXACERBATED BY: KEYBOARDING
LOSS OF SENSATION IN FEET: 0
ALLEVIATING FACTORS: ICE
ALLEVIATING FACTORS: SUPPORT
PAIN SCALE AT HIGHEST: 9
QUALITY: SHARP
QUALITY: TIGHT
OCCASIONAL FEELINGS OF UNSTEADINESS: 0
PAIN SCALE: 5
DEPRESSION: 0
QUALITY: THROBBING
EXACERBATED BY: LIFTING
ALLEVIATING FACTORS: MEDICATIONS
QUALITY: DISCOMFORT
EXACERBATED BY: OVERHEAD ACTIVITY
ALLEVIATING FACTORS: HEAT
EXACERBATED BY: MOVEMENT
QUALITY: DULL ACHE
QUALITY: GRINDING
ALLEVIATING FACTORS: RELAXATION
ALLEVIATING FACTORS: REST
QUALITY: CRAMPING
PAIN SCALE AT LOWEST: 3
ALLEVIATING FACTORS: CHANGE IN POSITION

## 2025-04-11 NOTE — PROGRESS NOTES
"Physical Therapy Evaluation and Treatment     Patient Name: Becca Horvath  MRN: [id number]  Encounter date: 2025  Time Calculation  Start Time: 1015  Stop Time: 1055  Time Calculation (min): 40 min    Visit # 1 of MN  Visits/Dates Authorized: 25 COPAY 100% COVERAGE V ARE MED [facility] REF#[id number]    Current Problem:   Problem List Items Addressed This Visit    None  Visit Diagnoses         Codes    Neck pain     M54.2          Precautions:          Steadi Fall Risk  One or more falls in the last year? No  How many Times?    Was the patient injured in the fall?    Has trouble stepping onto curb? No  Advised to use a cane or walker to get around safely? No  Often has to rush to toilet? No  Feels unsteady when walking? No  Has lost some feeling in feet? No  Often feels sad or depressed? No  Steadies self on furniture while walking at home? No  Takes medicine that makes them feel lightheaded or more tired than usual? No  Worried about Falling? No  Takes medicine to sleep or improve mood? No  Needs to push with hands when rising from a chair? No      Subjective Evaluation    History of Present Illness  Date of onset: 3/1/2025  Mechanism of injury: C-C - cerv/shldr \"locked-up\", tightness, stiffness  2025  X-ray-RESULTS: Narrowed C3-4 through C6-7 interspaces with mild posterior  spurring.  No subluxation or compression fracture.  Spinal laminar line  is intact.  Prevertebral soft tissues are normal.  No acute changes.          Quality of life: fair    Pain  Current pain ratin  At best pain rating: 3  At worst pain ratin  Quality: dull ache, throbbing, tight, cramping, grinding, sharp and discomfort  Relieving factors: change in position, medications, relaxation, rest, support, ice and heat  Aggravating factors: keyboarding, lifting, movement and overhead activity  Progression: no change    Social Support  Lives in: one-story house  Lives with: alone    Diagnostic Tests  X-ray: " abnormal    Treatments  Previous treatment: medication  Current treatment: medication  Patient Goals  Patient goals for therapy: decreased pain, increased motion, increased strength and return to sport/leisure activities  Patient goal:  - 50-60hrs/wk            Objective     Static Posture   General Observations  Flexed, guarded and scoliosis.     Head  Forward and tilted left.    Shoulders  Asymmetric shoulders and rounded.    Scapulae  Left winging and right winging.    Thoracic Spine  Hyperkyphosis.    Postural Observations  Seated posture: fair  Standing posture: fair  Correction of posture: has no consistent effect      Neurological Testing     Sensation   Cervical/Thoracic   Left   Intact: light touch    Right   Intact: light touch    Reflexes   Left   Deltoid (C5): trace (1+)  Biceps (C5/C6): trace (1+)  Brachioradialis (C6): trace (1+)  Triceps (C7): trace (1+)    Right   Deltoid (C5): trace (1+)  Biceps (C5/C6): trace (1+)  Brachioradialis (C6): trace (1+)  Triceps (C7): trace (1+)    Palpation   Left   Tenderness of the cervical interspinals, cervical paraspinals, latissimus, levator scapulae, lower trapezius, middle trapezius, pectoralis major, pectoralis minor, rhomboids, scalenes, sternocleidomastoid, suboccipitals and upper trapezius.   Trigger point to cervical interspinals, cervical paraspinals, latissimus, levator scapulae, lower trapezius, middle trapezius, pectoralis major, pectoralis minor, rhomboids, scalenes, sternocleidomastoid, suboccipitals and upper trapezius.     Right Tenderness of the cervical interspinals, cervical paraspinals, latissimus, levator scapulae, lower trapezius, middle trapezius, pectoralis major, pectoralis minor, rhomboids, scalenes, sternocleidomastoid, suboccipitals and upper trapezius.   Trigger point to cervical interspinals, cervical paraspinals, latissimus, levator scapulae, lower trapezius, middle trapezius, pectoralis major, pectoralis minor, rhomboids,  scalenes, sternocleidomastoid, suboccipitals and upper trapezius.     Tenderness   Cervical Spine   Tenderness in the facet joint, spinous process, left scapula and right scapula.     Active Range of Motion   Cervical/Thoracic Spine   Cervical  Subcranial protraction: restricted and with pain   Subcranial retraction: with pain   Flexion: 30 degrees with pain  Extension: 10 degrees with pain  Left lateral flexion: 10 degrees with pain  Right lateral flexion: 10 degrees with pain  Left rotation: 20 degrees with pain  Right rotation: 20 degrees with pain    Strength/Myotome Testing   Cervical Spine   Neck extension: 4-  Neck flexion: 4-    Left   Neck lateral flexion (C3): 4-    Right   Neck lateral flexion (C3): 4-    Left Shoulder     Planes of Motion   Flexion: 4   Abduction: 4   External rotation at 0°: 4   Internal rotation at 0°: 4     Right Shoulder     Planes of Motion   Flexion: 4   Abduction: 4   External rotation at 0°: 4   Internal rotation at 0°: 4     Left Elbow   Flexion: WFL  Extension: WFL    Right Elbow   Flexion: WFL  Extension: WFL    Left Wrist/Hand   Wrist extension: WFL  Wrist flexion: WFL  Radial deviation: WFL  Ulnar deviation: WFL    Right Wrist/Hand   Wrist extension: WFL  Wrist flexion: WFL  Radial deviation: WFL  Ulnar deviation: WFL    Tests   Cervical   Negative repeated extension and repeated flexion.     Left   Negative active compression ([place]) and cervical distraction.             Objective     Static Posture   General Observations  Flexed, guarded and scoliosis.     Head  Forward and tilted left.    Shoulders  Asymmetric shoulders and rounded.    Scapulae  Left winging and right winging.    Thoracic Spine  Hyperkyphosis.    Postural Observations  Seated posture: fair  Standing posture: fair  Correction of posture: has no consistent effect      Neurological Testing     Sensation   Cervical/Thoracic   Left   Intact: light touch    Right   Intact: light touch    Reflexes   Left    Deltoid (C5): trace (1+)  Biceps (C5/C6): trace (1+)  Brachioradialis (C6): trace (1+)  Triceps (C7): trace (1+)    Right   Deltoid (C5): trace (1+)  Biceps (C5/C6): trace (1+)  Brachioradialis (C6): trace (1+)  Triceps (C7): trace (1+)    Palpation   Left   Tenderness of the cervical interspinals, cervical paraspinals, latissimus, levator scapulae, lower trapezius, middle trapezius, pectoralis major, pectoralis minor, rhomboids, scalenes, sternocleidomastoid, suboccipitals and upper trapezius.   Trigger point to cervical interspinals, cervical paraspinals, latissimus, levator scapulae, lower trapezius, middle trapezius, pectoralis major, pectoralis minor, rhomboids, scalenes, sternocleidomastoid, suboccipitals and upper trapezius.     Right Tenderness of the cervical interspinals, cervical paraspinals, latissimus, levator scapulae, lower trapezius, middle trapezius, pectoralis major, pectoralis minor, rhomboids, scalenes, sternocleidomastoid, suboccipitals and upper trapezius.   Trigger point to cervical interspinals, cervical paraspinals, latissimus, levator scapulae, lower trapezius, middle trapezius, pectoralis major, pectoralis minor, rhomboids, scalenes, sternocleidomastoid, suboccipitals and upper trapezius.     Tenderness   Cervical Spine   Tenderness in the facet joint, spinous process, left scapula and right scapula.     Active Range of Motion   Cervical/Thoracic Spine   Cervical  Subcranial protraction: restricted and with pain   Subcranial retraction: with pain   Flexion: 30 degrees with pain  Extension: 10 degrees with pain  Left lateral flexion: 10 degrees with pain  Right lateral flexion: 10 degrees with pain  Left rotation: 20 degrees with pain  Right rotation: 20 degrees with pain    Strength/Myotome Testing   Cervical Spine   Neck extension: 4-  Neck flexion: 4-    Left   Neck lateral flexion (C3): 4-    Right   Neck lateral flexion (C3): 4-    Left Shoulder     Planes of Motion   Flexion: 4    Abduction: 4   External rotation at 0°: 4   Internal rotation at 0°: 4     Right Shoulder     Planes of Motion   Flexion: 4   Abduction: 4   External rotation at 0°: 4   Internal rotation at 0°: 4     Left Elbow   Flexion: WFL  Extension: WFL    Right Elbow   Flexion: WFL  Extension: WFL    Left Wrist/Hand   Wrist extension: WFL  Wrist flexion: WFL  Radial deviation: WFL  Ulnar deviation: WFL    Right Wrist/Hand   Wrist extension: WFL  Wrist flexion: WFL  Radial deviation: WFL  Ulnar deviation: WFL    Tests   Cervical   Negative repeated extension and repeated flexion.     Left   Negative active compression ([place]) and cervical distraction.       Other Outcome Measures:   NDI - 7  Treatments:   HEP  Therapeutic Exercise 63103:     HEP / Access Codes:   Access Code: [email or web address]  URL: [email or web address]  Date: 04/11/2025  Prepared by: John Brown    Exercises  - Seated Cervical Retraction  - 1 x daily - 7 x weekly - 2 sets - 5 reps - 2 hold  - Seated Scapular Retraction  - 1 x daily - 7 x weekly - 2 sets - 10 reps - 2 hold  - Seated Upper Trapezius Stretch  - 1 x daily - 7 x weekly - 1 sets - 2 reps - 10 hold  - Seated Levator Scapulae Stretch  - 1 x daily - 7 x weekly - 1 sets - 2 reps - 10 hold  - Doorway Pec Stretch at 90 Degrees Abduction  - 1 x daily - 7 x weekly - 1 sets - 2 reps - 10 hold  - Seated Cervical Rotation AROM  - 1 x daily - 7 x weekly - 1 sets - 3 reps - 3 hold  - Standing 'L' Stretch at Counter  - 1 x daily - 7 x weekly - 1 sets - 2 reps - 10 hold  - Seated Cervical Sidebending AROM  - 1 x daily - 7 x weekly - 1 sets - 5 reps - 2 hold  - Standing Lower Cervical and Upper Thoracic Stretch  - 1 x daily - 7 x weekly - 1 sets - 3 reps - 10 hold  Assessment & Plan     Assessment  Impairments: abnormal muscle firing, abnormal muscle tone, abnormal or restricted ROM, lacks appropriate home exercise program and pain with function  Prognosis: fair    Goals   -  50-60hrs/wk    Plan  Therapy options: will be seen for skilled physical therapy services  Planned modality interventions: electrical stimulation/Russian stimulation and TENS  Planned therapy interventions: body mechanics training, flexibility, functional ROM exercises, joint mobilization, manual therapy, postural training, soft tissue mobilization, spinal/joint mobilization, strengthening, stretching and therapeutic activities  Frequency: 2x week  Duration in visits: 8  Duration in weeks: 4  Treatment plan discussed with: patient      OP PT Plan  Treatment/Interventions: Dry needling, Education/ Instruction, Electrical stimulation, Manual therapy, Mechanical traction, Taping techniques, Therapeutic activities, Therapeutic exercises  PT Plan: Skilled PT  PT Frequency: 2 times per week  Duration: 4weeks  Number of Treatments Authorized: 1/MN  Rehab Potential: Good  Plan of Care Agreement: Patient   Moderate complexity due to patient's clinical presentation being evolving with changing characteristics, with comorbidities/complexities to include chronic cerv pain with radiating symptoms, all of which may negatively impact rehab tolerance and progression.     SUMMARY -   Manual - MFR cerv - SOR/DTR  Passive - cerv/UT/LEV  Manual distraction   IASTM - cerv/UT/Lev  Joint mobs - GR1,2 - cerv/thoracic  Posture - PRE's (shldr/cerv/scap)   Modalities - TPDN, K-tape -PRN    Goals: Frequency - 1-2x/wk x 4-6 wks    Goals:  SHORT TERM GOALS:  Patient will report decrease in pain from 9/10 to </= 1/10 to improve quality of life.   Patient will improve cerv AROM to WFL in order to improve Cerv/Shldr/Scap functional mobility.  Patient will demonstrate OH reaching/lifting/behind back x 10 without compensation to demonstrate improved UE strength and self care independence  Patient will improve UE functional score to 40-50%  of normal limits (affected vs unaffected)  Patient independent with prescribed HEP  Pt Education - Independent  postural and  awareness and joint protection program  LONG TERM GOALS:  Patient will be independent with HEP to promote self management of condition  Patient will improve cerv AROM to within 90% pre injury/pre surgery status in order to improve control and regain functional mobility without restrictions.  Patient will perform self care, return to work/hobbies without restriction to demonstrate improved UE strength.   Patient will improve UE functional score to 60-70%  of normal limits (affected vs unaffected) - Functional Outcome Tool  Functional Level - reported % (hobbies/work/recreation)

## 2025-04-16 ENCOUNTER — TREATMENT (OUTPATIENT)
Dept: PHYSICAL THERAPY | Facility: CLINIC | Age: 65
End: 2025-04-16
Payer: COMMERCIAL

## 2025-04-16 DIAGNOSIS — M54.2 CERVICALGIA: ICD-10-CM

## 2025-04-16 PROCEDURE — 97140 MANUAL THERAPY 1/> REGIONS: CPT | Mod: GP

## 2025-04-16 PROCEDURE — 97110 THERAPEUTIC EXERCISES: CPT | Mod: GP

## 2025-04-16 ASSESSMENT — PAIN - FUNCTIONAL ASSESSMENT: PAIN_FUNCTIONAL_ASSESSMENT: 0-10

## 2025-04-16 ASSESSMENT — PAIN SCALES - GENERAL: PAINLEVEL_OUTOF10: 5 - MODERATE PAIN

## 2025-04-16 ASSESSMENT — PAIN DESCRIPTION - DESCRIPTORS: DESCRIPTORS: ACHING;CRAMPING;DISCOMFORT;HEAVINESS

## 2025-04-16 NOTE — PROGRESS NOTES
"Physical Therapy Treatment    Patient Name: Becca Horvath  MRN: [id number]  Today's Date: 4/16/2025  Time Calculation  Start Time: 1015  Stop Time: 1050  Time Calculation (min): 35 min  PT Therapeutic Procedures Time Entry  Manual Therapy Time Entry: 35  Therapeutic Exercise Time Entry: 8    Insurance:  Visit number: Number of Treatments Authorized: 2/MN  Authorization info: 25 COPAY 100% COVERAGE V ARE MED [facility] REF#[id number]  Insurance Type: Payor: [employer] / Plan: [employer] / Product Type: *No Product type* /     Current Problem   1. Cervicalgia  Follow Up In Physical Therapy          Subjective   General   Reason for Referral: neck pain  Referred By: jeronimo  Precautions: None     Pain   Pain Assessment: 0-10  0-10 (Numeric) Pain Score: 5 - Moderate pain  Pain Type: Chronic pain  Pain Location: Neck  Pain Orientation: Right, Left  Pain Descriptors: Aching, Cramping, Discomfort, Heaviness  Pain Frequency: Intermittent  Pain Onset: Gradual  Clinical Progression: Not changed  Patient's Stated Pain Goal: No pain  Pain Interventions: Acupressure, Massage, Therapeutic touch  Response to Interventions: Decrease in pain, Content/relaxed  Post Treatment Pain Level 3/10    Objective       Treatments:  Therapeutic Exercise:  Therapeutic Exercise  Therapeutic Exercise Performed: Yes  Therapeutic Exercise Activity 1: seated cervical retraction 2x10  Therapeutic Exercise Activity 2: Seated UT/LEV 3x15\"      Manual:  Manual Therapy  Manual Therapy Performed: Yes  Manual Therapy Activity 1: MFR - SOR/DTR x 20min  Manual Therapy Activity 2: IASTM - R/L UT/LEV/Cerv Paraspinals x10min  Manual Therapy Activity 3: Manual cerv distraction x5min  Manual Therapy Activity 4: JT MOBS - C2-T2 PA GR1,2 x8min    Assessment   Assessment:   PT Assessment  PT Assessment Results: Decreased strength, Decreased range of motion, Decreased endurance, Decreased mobility, Orthopedic restrictions, Pain  Rehab " Prognosis: Good  Evaluation/Treatment Tolerance: Patient tolerated treatment well, Patient limited by pain  Strengths: Ability to acquire knowledge, Attitude of self, Coping skills    Plan:   OP PT Plan  Treatment/Interventions: Dry needling, Education/ Instruction, Electrical stimulation, Manual therapy, Mechanical traction, Taping techniques, Therapeutic activities, Therapeutic exercises  PT Plan: Skilled PT  PT Frequency: 2 times per week  Duration: 4weeks  Number of Treatments Authorized: 2/MN  Rehab Potential: Good  Plan of Care Agreement: Patient    OP EDUCATION:  Outpatient Education  Individual(s) Educated: Patient  Education Provided: Anatomy, Body Mechanics, Home Exercise Program, Home Safety, Physiology, POC, Posture  Risk and Benefits Discussed with Patient/Caregiver/Other: yes  Patient/Caregiver Demonstrated Understanding: yes  Plan of Care Discussed and Agreed Upon: yes  Patient Response to Education: Patient/Caregiver Verbalized Understanding of Information, Patient/Caregiver Performed Return Demonstration of Exercises/Activities, Patient/Caregiver Asked Appropriate Questions

## 2025-04-22 ENCOUNTER — TREATMENT (OUTPATIENT)
Dept: PHYSICAL THERAPY | Facility: CLINIC | Age: 65
End: 2025-04-22
Payer: COMMERCIAL

## 2025-04-22 ENCOUNTER — APPOINTMENT (OUTPATIENT)
Dept: PRIMARY CARE | Facility: CLINIC | Age: 65
End: 2025-04-22
Payer: COMMERCIAL

## 2025-04-22 DIAGNOSIS — M54.2 CERVICALGIA: ICD-10-CM

## 2025-04-22 PROCEDURE — 97110 THERAPEUTIC EXERCISES: CPT | Mod: GP

## 2025-04-22 PROCEDURE — 97140 MANUAL THERAPY 1/> REGIONS: CPT | Mod: GP

## 2025-04-22 ASSESSMENT — PAIN - FUNCTIONAL ASSESSMENT: PAIN_FUNCTIONAL_ASSESSMENT: 0-10

## 2025-04-22 ASSESSMENT — PAIN SCALES - GENERAL: PAINLEVEL_OUTOF10: 5 - MODERATE PAIN

## 2025-04-22 ASSESSMENT — PAIN DESCRIPTION - DESCRIPTORS: DESCRIPTORS: ACHING;DISCOMFORT;NAGGING

## 2025-04-22 NOTE — PROGRESS NOTES
"Physical Therapy Treatment    Patient Name: Becca Horvath  MRN: [id number]  Today's Date: 4/22/2025  Time Calculation  Start Time: 1000  Stop Time: 1029  Time Calculation (min): 29 min  PT Therapeutic Procedures Time Entry  Manual Therapy Time Entry: 15  Therapeutic Exercise Time Entry: 15    Insurance:  Visit number: Number of Treatments Authorized: 3/MN  Authorization info: 25 COPAY 100% COVERAGE V ARE [facility] REF#[id number]  Insurance Type: Payor: [employer] / Plan: [employer] / Product Type: *No Product type* /     Current Problem   1. Cervicalgia  Follow Up In Physical Therapy          Subjective   General   Reason for Referral: neck pain  Referred By: jeronimo  Precautions:     Pain   Pain Assessment: 0-10  0-10 (Numeric) Pain Score: 5 - Moderate pain  Pain Type: Chronic pain  Pain Location: Neck  Pain Orientation: Right, Left  Pain Descriptors: Aching, Discomfort, Nagging  Pain Frequency: Intermittent  Pain Onset: Gradual  Clinical Progression: Gradually improving  Patient's Stated Pain Goal: No pain  Pain Interventions: Acupressure, Distraction  Response to Interventions: Decrease in pain  Post Treatment Pain Level 4/10    Objective     Treatments:  Therapeutic Exercise:  Therapeutic Exercise  Therapeutic Exercise Performed: Yes  Therapeutic Exercise Activity 1: seated cervical retraction 2x10  Therapeutic Exercise Activity 2: Seated UT/LEV 3x15\"  Therapeutic Exercise Activity 3: PTB - rows 2x15  Therapeutic Exercise Activity 4: PTB - shldr ext 2x15  Therapeutic Exercise Activity 5: PTB - reverse fly 2x15  Therapeutic Exercise Activity 6: doorway pec - 90' 3x15\"     HEP - Access Code: [email or web address]  URL: [email or web address]  Date: 04/22/2025  Prepared by: John Brown    Exercises  - Shoulder Extension with Resistance  - 1 x daily - 4 x weekly - 3 sets - 10 reps - 1 hold  - Standing Row with Anchored Resistance  - 1 x daily - 4 x weekly - 3 sets - 10 reps - " 1 hold  - Standing Shoulder Horizontal Abduction with Anchored Resistance  - 1 x daily - 4 x weekly - 3 sets - 10 reps - 1 hold  Manual:  Manual Therapy  Manual Therapy Performed: Yes  Manual Therapy Activity 1: MFR - SOR/DTR x 15min  Manual Therapy Activity 2: TRPR - R/L UT/Lev  Manual Therapy Activity 3: Manual - cerv distarcio  Manual Therapy Activity 4: joint mobs - C2-c7 GR1,2 (PA)       Assessment   Assessment:   PT Assessment  PT Assessment Results: Decreased strength, Decreased range of motion, Decreased endurance, Decreased mobility, Orthopedic restrictions, Pain  Rehab Prognosis: Good  Evaluation/Treatment Tolerance: Patient tolerated treatment well, Patient limited by fatigue, Patient limited by pain  Strengths: Ability to acquire knowledge, Attitude of self, Coping skills    Plan:   OP PT Plan  Treatment/Interventions: Dry needling, Education/ Instruction, Electrical stimulation, Manual therapy, Mechanical traction, Taping techniques, Therapeutic activities, Therapeutic exercises  PT Plan: Skilled PT  PT Frequency: 2 times per week  Duration: 4weeks  Number of Treatments Authorized: 3/MN  Rehab Potential: Good  Plan of Care Agreement: Patient    OP EDUCATION:  Outpatient Education  Individual(s) Educated: Patient  Education Provided: Anatomy, Body Mechanics, Home Exercise Program, Home Safety, Physiology, POC, Posture  Risk and Benefits Discussed with Patient/Caregiver/Other: yes  Patient/Caregiver Demonstrated Understanding: yes  Plan of Care Discussed and Agreed Upon: yes  Patient Response to Education: Patient/Caregiver Verbalized Understanding of Information, Patient/Caregiver Performed Return Demonstration of Exercises/Activities, Patient/Caregiver Asked Appropriate Questions

## 2025-04-24 ENCOUNTER — TREATMENT (OUTPATIENT)
Dept: PHYSICAL THERAPY | Facility: CLINIC | Age: 65
End: 2025-04-24
Payer: COMMERCIAL

## 2025-04-24 DIAGNOSIS — M54.2 CERVICALGIA: Primary | ICD-10-CM

## 2025-04-24 PROCEDURE — 97110 THERAPEUTIC EXERCISES: CPT | Mod: GP,CQ

## 2025-04-24 PROCEDURE — 97140 MANUAL THERAPY 1/> REGIONS: CPT | Mod: GP,CQ

## 2025-04-24 ASSESSMENT — PAIN SCALES - GENERAL: PAINLEVEL_OUTOF10: 0 - NO PAIN

## 2025-04-24 ASSESSMENT — PAIN - FUNCTIONAL ASSESSMENT: PAIN_FUNCTIONAL_ASSESSMENT: 0-10

## 2025-04-24 NOTE — PROGRESS NOTES
Physical Therapy Treatment    Patient Name: Becca Horvath  MRN: [id number]  Today's Date: 4/24/2025    Current Problem  Problem List Items Addressed This Visit           ICD-10-CM    Cervicalgia - Primary M54.2       Insurance:  Payor: [employer] / Plan: [employer] / Product Type: *No Product type* /   Number of Treatments Authorized: 4/MN          Subjective   General  Reason for Referral: neck pain  Referred By: jeronimo  General Comment: PT STATES HE IS STIFF IN HIS NECK.  NO C/O PAIN TODAY.  HE GETS HEP IN WHEN HE CAN.    Performing HEP?: Yes    Precautions  Precautions  Precautions Comment: NONE  Pain  Pain Assessment: 0-10  0-10 (Numeric) Pain Score: 0 - No pain  Pain Type: Chronic pain  Pain Location: Neck  Pain Orientation: Right, Left  Patient's Stated Pain Goal: No pain    Objective   General Observation  General Observation: FWD POSTURE    Treatments:    Therapeutic Exercise  Therapeutic Exercise Activity 1: SCIFIT UE F/B MAN L2 X 6 MIN  Therapeutic Exercise Activity 2: PEC STRETCH LOW - MID X 1 MIN EACH  Therapeutic Exercise Activity 3: MATRIX SH EXT 10# X 1MIN  Therapeutic Exercise Activity 4: MATRIX ROWS 10# X 1 MIN  Therapeutic Exercise Activity 5: CERV RETRACTION X 1 MIN         Manual Therapy  Manual Therapy Activity 1: MFR SOR, CRANIAL PULL  Manual Therapy Activity 2: STM UT, SCALENES, SCM, LEV SCAP, C-PARA  Manual Therapy Activity 3: CERV PROM ROT, SB  Manual Therapy Activity 4: CERV MOBS PA, LAT GRADE 1,2  Manual Therapy Activity 5: STRETCH UT, SCALENES                        OP EDUCATION:  Outpatient Education  Education Comment: CONTINUE WITH CURRENT HEP    Assessment:  PT Assessment  Assessment Comment: PT AVERY EX'S WELL.  HE IS VERY TIGHT IN HIS CERV MUSCLES L > R.  NOTED INCREASED CERV ROM AND FLEXIBILITY AFTER MANUAL WORK.  PT FELT LOOSER AFTER SESSION.    Plan:  OP PT Plan  Treatment/Interventions: Dry needling, Education/ Instruction, Electrical stimulation, Manual  therapy, Mechanical traction, Taping techniques, Therapeutic activities, Therapeutic exercises  PT Plan: Skilled PT  PT Frequency: 2 times per week  Duration: 4weeks  Number of Treatments Authorized: 4/MN  Rehab Potential: Good  Plan of Care Agreement: Patient    Goals:       Time Calculation  Start Time: 1121  Stop Time: 1202  Time Calculation (min): 41 min  PT Therapeutic Procedures Time Entry  Manual Therapy Time Entry: 29  Therapeutic Exercise Time Entry: 12,

## 2025-04-29 ENCOUNTER — TREATMENT (OUTPATIENT)
Dept: PHYSICAL THERAPY | Facility: CLINIC | Age: 65
End: 2025-04-29
Payer: COMMERCIAL

## 2025-04-29 DIAGNOSIS — M54.2 CERVICALGIA: ICD-10-CM

## 2025-04-29 PROCEDURE — 97110 THERAPEUTIC EXERCISES: CPT | Mod: GP | Performed by: PHYSICAL THERAPIST

## 2025-04-29 PROCEDURE — 97140 MANUAL THERAPY 1/> REGIONS: CPT | Mod: GP | Performed by: PHYSICAL THERAPIST

## 2025-04-29 ASSESSMENT — PAIN SCALES - GENERAL: PAINLEVEL_OUTOF10: 5 - MODERATE PAIN

## 2025-04-29 ASSESSMENT — PAIN - FUNCTIONAL ASSESSMENT: PAIN_FUNCTIONAL_ASSESSMENT: 0-10

## 2025-04-29 NOTE — PROGRESS NOTES
"  Physical Therapy Treatment    Patient Name: Becca Horvath  MRN: [id number]  Today's Date: 4/29/2025  Time Calculation  Start Time: 1002  Stop Time: 1047  Time Calculation (min): 45 min  PT Therapeutic Procedures Time Entry  Manual Therapy Time Entry: 11  Therapeutic Exercise Time Entry: 28  Therapeutic Activity Time Entry: 5       Current Problem  Problem List Items Addressed This Visit           ICD-10-CM       Musculoskeletal and Injuries    Cervicalgia M54.2       Insurance:  Number of Treatments Authorized: 5/MN          Subjective   General  Reason for Referral: neck pain  Referred By: jeronimo  Past Medical History Relevant to Rehab: med hx reviewed DG  General Comment: Pt reports he's still feeling a lot of pain and stiffness. More left sided than right. Just got off work.    Performing HEP?: Yes    Precautions  Precautions  Precautions Comment: none  Pain  Pain Assessment: 0-10  0-10 (Numeric) Pain Score: 5 - Moderate pain  Pain Type: Chronic pain  Pain Location: Neck  Pain Orientation: Right, Left    Objective    (+) L>R lev scap/UT   Reduced UWR L>R scap      Treatments:  Therapeutic Exercise  Therapeutic Exercise Activity 1: SciFit UE F/B 3'/3' x 6 min  Therapeutic Exercise Activity 2: Seated thoracic extension over chair with arms crossed x 10 reps  Therapeutic Exercise Activity 3: Doorway stretch high / low x 30 sec each  Therapeutic Exercise Activity 4: B scaption wall slides x 1 min  Therapeutic Exercise Activity 5: Standing cervical retractions with hold 3\" x 15 reps  Therapeutic Exercise Activity 6: B shld flexion x 10 reps - UT relaxation  Therapeutic Exercise Activity 7: R,L thoracic rotation at 90* flexion x 1 min each  Therapeutic Exercise Activity 8: B scaption raises to 90* with UT relaxation x 1 min  Therapeutic Exercise Activity 9: B serratus slide with blue foam roller x 1 min  Therapeutic Exercise Activity 10: Supine chin tucks x 10    Therapeutic Activity  Therapeutic Activity Performed: " Yes  Therapeutic Activity 1: Simulating work positions and postures with UT relaxation correct    Manual Therapy  Manual Therapy Activity 1: Cervical distraction with suboccipital release  Manual Therapy Activity 2: STM L>R UT, lev scap and mid trap/rhomboids  Manual Therapy Activity 3: L>R UWR scap    OP EDUCATION:  Outpatient Education  Education Comment: Reviewed work postures and UT relaxation in multi positions    Assessment:  PT Assessment  Assessment Comment: Pt continues to have L>R neck and posterior scap pain with short term benefit from tx. Educated on postural modifications for work setting and avoiding UT overuse with good understanding. Some discomfort with cervical retractions seated/standing, improving with supine positioning. Significant tension along L>R scap and UT region, warned on post tx soreness.    Plan:  OP PT Plan  Treatment/Interventions: Dry needling, Education/ Instruction, Electrical stimulation, Manual therapy, Mechanical traction, Taping techniques, Therapeutic activities, Therapeutic exercises  PT Plan: Skilled PT (Review work postures and cervical retraction)  PT Frequency: 2 times per week  Duration: 4weeks  Number of Treatments Authorized: 5/MN  Rehab Potential: Good  Plan of Care Agreement: Patient       Zaida ASIF Rice, PT

## 2025-05-01 ENCOUNTER — TREATMENT (OUTPATIENT)
Dept: PHYSICAL THERAPY | Facility: CLINIC | Age: 65
End: 2025-05-01
Payer: COMMERCIAL

## 2025-05-01 DIAGNOSIS — M54.2 CERVICALGIA: ICD-10-CM

## 2025-05-01 PROCEDURE — 97110 THERAPEUTIC EXERCISES: CPT | Mod: GP

## 2025-05-01 PROCEDURE — 97140 MANUAL THERAPY 1/> REGIONS: CPT | Mod: GP

## 2025-05-01 ASSESSMENT — PAIN - FUNCTIONAL ASSESSMENT: PAIN_FUNCTIONAL_ASSESSMENT: 0-10

## 2025-05-01 ASSESSMENT — PAIN SCALES - GENERAL: PAINLEVEL_OUTOF10: 5 - MODERATE PAIN

## 2025-05-01 NOTE — PROGRESS NOTES
"Physical Therapy Treatment    Patient Name: Becca Horvath  MRN: [id number]  Today's Date: 5/1/2025  Time Calculation  Start Time: 1035  Stop Time: 1115  Time Calculation (min): 40 min  PT Therapeutic Procedures Time Entry  Manual Therapy Time Entry: 20  Therapeutic Exercise Time Entry: 20    Insurance:  Visit number: Number of Treatments Authorized: 6/MN  Authorization info: 25 COPAY 100% COVERAGE V ARE MED NEC YR REF#[id number]  Insurance Type: Payor: [employer] / Plan: [employer] / Product Type: *No Product type* /     Current Problem   1. Cervicalgia  Follow Up In Physical Therapy          Subjective   General   Reason for Referral: neck pain  Referred By: jeronimo  Past Medical History Relevant to Rehab: med hx reviewed DG  General Comment: Pt reports he's still feeling a lot of pain and stiffness. More left sided than right. Just got off work.  Precautions:  Precautions  Precautions Comment: none  Pain   Pain Assessment: 0-10  0-10 (Numeric) Pain Score: 5 - Moderate pain  Pain Type: Chronic pain  Pain Location: Neck  Pain Orientation: Right, Left  Post Treatment Pain Level 5/10    Objective     Treatments:  Therapeutic Exercise:  Therapeutic Exercise  Therapeutic Exercise Activity 1: SciFit UE F/B 4min fwd L3  Therapeutic Exercise Activity 2: Seated thoracic extension over chair with arms crossed x 10 reps  Therapeutic Exercise Activity 3: Doorway stretch high / low x 30 sec each  Therapeutic Exercise Activity 4: B scaption wall slides x 1 min  Therapeutic Exercise Activity 5: Standing cervical retractions with hold 3\" x 15 reps  Therapeutic Exercise Activity 6: B shld flexion x 10 reps - UT relaxation  Therapeutic Exercise Activity 7: R,L thoracic rotation at 90* flexion x 1 min each  Therapeutic Exercise Activity 8: B scaption raises to 90* with UT relaxation x 1 min  Therapeutic Exercise Activity 9: B serratus slide with blue foam roller x 1 min  Therapeutic Exercise Activity 10: " Supine chin tucks x 10  Therapeutic activity:  Therapeutic Activity  Therapeutic Activity Performed: Yes     Manual:  Manual Therapy  Manual Therapy Activity 1: MFR 0 SOR/DTR  Manual Therapy Activity 2: TRPR - B/L UT/lec/scm  Manual Therapy Activity 3: Cerv Traction/distarction x10min  Assessment   Assessment:   PT Assessment  Assessment Comment: Pt continues to have L>R neck and posterior scap pain with short term benefit from tx. Educated on postural modifications for work setting and avoiding UT overuse with good understanding. Some discomfort with cervical retractions seated/standing, improving with supine positioning. Significant tension along L>R scap and UT region, warned on post tx soreness.    Plan:   OP PT Plan  Treatment/Interventions: Dry needling, Education/ Instruction, Electrical stimulation, Manual therapy, Mechanical traction, Taping techniques, Therapeutic activities, Therapeutic exercises  PT Plan: Skilled PT (Review work postures and cervical retraction)  PT Frequency: 2 times per week  Duration: 4weeks  Number of Treatments Authorized: 6/MN  Rehab Potential: Good  Plan of Care Agreement: Patient    OP EDUCATION:  Outpatient Education  Education Comment: Reviewed work postures and UT relaxation in multi positions

## 2025-05-06 ENCOUNTER — TREATMENT (OUTPATIENT)
Dept: PHYSICAL THERAPY | Facility: CLINIC | Age: 65
End: 2025-05-06
Payer: COMMERCIAL

## 2025-05-06 DIAGNOSIS — M54.2 CERVICALGIA: ICD-10-CM

## 2025-05-06 PROCEDURE — 97110 THERAPEUTIC EXERCISES: CPT | Mod: GP,CQ

## 2025-05-06 PROCEDURE — 97140 MANUAL THERAPY 1/> REGIONS: CPT | Mod: GP,CQ

## 2025-05-06 ASSESSMENT — PAIN - FUNCTIONAL ASSESSMENT: PAIN_FUNCTIONAL_ASSESSMENT: 0-10

## 2025-05-06 ASSESSMENT — PAIN SCALES - GENERAL: PAINLEVEL_OUTOF10: 1

## 2025-05-06 NOTE — PROGRESS NOTES
Physical Therapy Treatment    Patient Name: Becca Horvath  MRN: [id number]  Today's Date: 5/6/2025    Current Problem  Problem List Items Addressed This Visit           ICD-10-CM    Cervicalgia M54.2       Insurance:  Payor: [employer] / Plan: [employer] / Product Type: *No Product type* /   Number of Treatments Authorized: 7/MN          Subjective   General  Reason for Referral: neck pain  Referred By: jeronimo  Past Medical History Relevant to Rehab: med hx reviewed DG  General Comment: PT STATES HE IS DOING A LITTLE BETTER.  HAS MORE SORENESS AND TIGHTNESS THAN PAIN NOW.    Performing HEP?: Yes    Precautions  Precautions  Precautions Comment: none  Pain  Pain Assessment: 0-10  0-10 (Numeric) Pain Score: 1  Pain Type: Chronic pain  Pain Location: Neck  Pain Orientation: Right, Left    Objective     General Observation  General Observation: FWD POSTURE    Treatments:    Therapeutic Exercise  Therapeutic Exercise Activity 1: SciFit UE F/B MAN L3 X 6 MIN  Therapeutic Exercise Activity 2: PEC STRETCH LOW - MID X 1 MIN EACH  Therapeutic Exercise Activity 3: MATRIX SH EXT 10# X 1MIN  Therapeutic Exercise Activity 4: MATRIX ROWS 10# X 1 MIN  Therapeutic Exercise Activity 5: SPO R/L 5# X 1 MIN EACH  Therapeutic Exercise Activity 6: SH ER RED TBAND X 1 MIN         Manual Therapy  Manual Therapy Activity 1: MFR SOR, CRANIAL PULL  Manual Therapy Activity 2: STM UT, SCALENES, SCM, LEV SCAP, C-PARA  Manual Therapy Activity 3: CERV PROM ROT, SB  Manual Therapy Activity 4: CERV MOBS PA, LAT GRADE 1,2                        OP EDUCATION:  Outpatient Education  Education Comment: CONTINUE WITH CURRENT HEP    Assessment:  PT Assessment  Assessment Comment: PT AVERY EX'S WELL.  HE CONTINUES TO HAVE TIGHTNESS AND TENDERNESS IN HIS CERV MUSCLES, BUT IMPROVING.  PT FELT LOOSER AFTER SESSION.    Plan:  OP PT Plan  Treatment/Interventions: Dry needling, Education/ Instruction, Electrical stimulation, Manual  therapy, Mechanical traction, Taping techniques, Therapeutic activities, Therapeutic exercises  PT Plan: Skilled PT (Review work postures and cervical retraction)  PT Frequency: 2 times per week  Duration: 4weeks  Number of Treatments Authorized: 7/MN  Rehab Potential: Good  Plan of Care Agreement: Patient    Goals:       Time Calculation  Start Time: 1036  Stop Time: 1115  Time Calculation (min): 39 min  PT Therapeutic Procedures Time Entry  Manual Therapy Time Entry: 26  Therapeutic Exercise Time Entry: 13,

## 2025-05-08 ENCOUNTER — TREATMENT (OUTPATIENT)
Dept: PHYSICAL THERAPY | Facility: CLINIC | Age: 65
End: 2025-05-08
Payer: COMMERCIAL

## 2025-05-08 DIAGNOSIS — M54.2 CERVICALGIA: ICD-10-CM

## 2025-05-08 PROCEDURE — 97140 MANUAL THERAPY 1/> REGIONS: CPT | Mod: GP

## 2025-05-08 PROCEDURE — 97110 THERAPEUTIC EXERCISES: CPT | Mod: GP

## 2025-05-08 ASSESSMENT — PAIN SCALES - GENERAL: PAINLEVEL_OUTOF10: 1

## 2025-05-08 ASSESSMENT — PAIN - FUNCTIONAL ASSESSMENT: PAIN_FUNCTIONAL_ASSESSMENT: 0-10

## 2025-05-08 NOTE — PROGRESS NOTES
Physical Therapy Treatment    Patient Name: Becca Horvath  MRN: [id number]  Today's Date: 5/8/2025  Time Calculation  Start Time: 1030  Stop Time: 1115  Time Calculation (min): 45 min  PT Therapeutic Procedures Time Entry  Manual Therapy Time Entry: 20  Therapeutic Exercise Time Entry: 25    Insurance:  Visit number: Number of Treatments Authorized: 8/MN  Authorization info: 25 COPAY 100% COVERAGE V ARE MED NEC YR REF#[id number]  Insurance Type: Payor: [employer] / Plan: [employer] / Product Type: *No Product type* /     Current Problem   1. Cervicalgia  Follow Up In Physical Therapy          Subjective   General   Reason for Referral: neck pain  Referred By: jeronimo  Past Medical History Relevant to Rehab: med hx reviewed DG  General Comment: PT STATES HE IS DOING A LITTLE BETTER.  HAS MORE SORENESS AND TIGHTNESS THAN PAIN NOW.  Precautions:  Precautions  Precautions Comment: none  Pain   Pain Assessment: 0-10  0-10 (Numeric) Pain Score: 1  Pain Type: Chronic pain  Pain Location: Neck  Pain Orientation: Right, Left  Post Treatment Pain Level 3    Objective   Cerv - AROM - WNL  MMT - Cerv 5/5    Treatments:  Therapeutic Exercise:  Therapeutic Exercise  Therapeutic Exercise Activity 1: SciFit UE F/B MAN L3 X 6 MIN  Therapeutic Exercise Activity 2: PEC STRETCH LOW - MID X 1 MIN EACH  Therapeutic Exercise Activity 3: RTB - rows 3x10  Therapeutic Exercise Activity 4: RTB  - Shldr ext 3x10  Therapeutic Exercise Activity 5: RTB - reverse fly 3x10  Therapeutic Exercise Activity 6: SH ER RED TBAND X 1 MIN     Manual:  Manual Therapy  Manual Therapy Activity 1: MFR - SOR/DTR  Manual Therapy Activity 2: TRPR - UT/LEV/SCAP  Manual Therapy Activity 3: Cerv - PROM - end range  Manual Therapy Activity 4: Jt Mobs - cerv distraction    Assessment   Assessment:   PT Assessment  PT Assessment Results: Decreased strength, Decreased range of motion, Decreased endurance, Decreased mobility, Pain  Rehab  Prognosis: Good  Evaluation/Treatment Tolerance: Patient tolerated treatment well, Patient limited by fatigue, Patient limited by pain  Strengths: Ability to acquire knowledge, Attitude of self, Coping skills, Leisure activity, Physical health, Rehab experience  Assessment Comment: PT AVERY EX'S WELL.  HE CONTINUES TO HAVE TIGHTNESS AND TENDERNESS IN HIS CERV MUSCLES, BUT IMPROVING.  PT FELT LOOSER AFTER SESSION.    Plan:   OP PT Plan  Treatment/Interventions: Dry needling, Education/ Instruction, Electrical stimulation, Manual therapy, Mechanical traction, Taping techniques, Therapeutic activities, Therapeutic exercises  PT Plan: Skilled PT (Review work postures and cervical retraction)  PT Frequency: Other (Comment)  Duration: Patient to continue with HEP. Achieved all LTG/STG. I with HEP.  Number of Treatments Authorized: 8/MN  Rehab Potential: Good  Plan of Care Agreement: Patient    OP EDUCATION:  Outpatient Education  Individual(s) Educated: Patient  Education Provided: Anatomy, Body Mechanics, Home Exercise Program, Physiology, Posture  Risk and Benefits Discussed with Patient/Caregiver/Other: yes  Patient/Caregiver Demonstrated Understanding: yes  Plan of Care Discussed and Agreed Upon: yes  Education Comment: CONTINUE WITH CURRENT HEP    Goals: SHORT TERM GOALS:  Patient will report decrease in pain from 9/10 to </= 1/10 to improve quality of life. A  Patient will improve cerv AROM to WFL in order to improve Cerv/Shldr/Scap functional mobility. A  Patient will demonstrate OH reaching/lifting/behind back x 10 without compensation to demonstrate improved UE strength and self care independence A  Patient will improve UE functional score to 40-50%  of normal limits (affected vs unaffected) A  Patient independent with prescribed HEP A  Pt Education - Independent postural and  awareness and joint protection program A  LONG TERM GOALS:  Patient will be independent with HEP to promote self management of  condition A  Patient will improve cerv AROM to within 90% pre injury/pre surgery status in order to improve control and regain functional mobility without restrictions. A  Patient will perform self care, return to work/hobbies without restriction to demonstrate improved UE strength. A  Patient will improve UE functional score to 60-70%  of normal limits (affected vs unaffected) - Functional Outcome Tool PA  Functional Level - reported % (hobbies/work/recreation)  A

## 2025-06-30 DIAGNOSIS — K21.9 GASTROESOPHAGEAL REFLUX DISEASE, UNSPECIFIED WHETHER ESOPHAGITIS PRESENT: ICD-10-CM

## 2025-06-30 RX ORDER — PANTOPRAZOLE SODIUM 40 MG/1
40 TABLET, DELAYED RELEASE ORAL
Qty: 90 TABLET | Refills: 3 | Status: SHIPPED | OUTPATIENT
Start: 2025-06-30 | End: 2025-09-28

## 2025-06-30 NOTE — TELEPHONE ENCOUNTER
Fax received from [facility] for a refill on Pantoprazole 40mg.  Last visit: 3/31/25.    [facility]: [phone]

## (undated) DEVICE — ABSORBATACK, 5MM, SINGLE USE/W 15 ABSORBABLE TACKS

## (undated) DEVICE — SCOPE WARMER, LAPAROSCOPE, BAG ONLY, LF

## (undated) DEVICE — SUTURE, VICRYL, 4-0, 18 IN, PS2, UNDYED

## (undated) DEVICE — TUBESET, HIGH FLOW II, 45 LITER PNEUMO SURE, DISP.

## (undated) DEVICE — ACCESS SYS, KII SHIELDED BLADED, Z-THREAD, 5X100CM

## (undated) DEVICE — STRIP, SKIN CLOSURE, STERI STRIP, REINFORCED, 0.5 X 4 IN

## (undated) DEVICE — SUTURE, VICRYL, 0, 27 IN, UR-6, VIOLET

## (undated) DEVICE — SLEEVE, KII, Z-THREAD, 5X100CM